# Patient Record
Sex: FEMALE | Race: BLACK OR AFRICAN AMERICAN | Employment: FULL TIME | URBAN - METROPOLITAN AREA
[De-identification: names, ages, dates, MRNs, and addresses within clinical notes are randomized per-mention and may not be internally consistent; named-entity substitution may affect disease eponyms.]

---

## 2018-05-14 ENCOUNTER — APPOINTMENT (OUTPATIENT)
Dept: MRI IMAGING | Age: 58
End: 2018-05-14
Attending: HOSPITALIST
Payer: COMMERCIAL

## 2018-05-14 ENCOUNTER — APPOINTMENT (OUTPATIENT)
Dept: CT IMAGING | Age: 58
End: 2018-05-14
Attending: EMERGENCY MEDICINE
Payer: COMMERCIAL

## 2018-05-14 ENCOUNTER — HOSPITAL ENCOUNTER (OUTPATIENT)
Age: 58
Setting detail: OBSERVATION
Discharge: HOME | End: 2018-05-15
Attending: EMERGENCY MEDICINE | Admitting: HOSPITALIST
Payer: COMMERCIAL

## 2018-05-14 ENCOUNTER — APPOINTMENT (OUTPATIENT)
Dept: CT IMAGING | Age: 58
End: 2018-05-14
Attending: HOSPITALIST
Payer: COMMERCIAL

## 2018-05-14 ENCOUNTER — APPOINTMENT (OUTPATIENT)
Dept: GENERAL RADIOLOGY | Age: 58
End: 2018-05-14
Attending: EMERGENCY MEDICINE
Payer: COMMERCIAL

## 2018-05-14 DIAGNOSIS — R20.0 NUMBNESS AND TINGLING OF LEFT SIDE OF FACE: ICD-10-CM

## 2018-05-14 DIAGNOSIS — R20.2 NUMBNESS AND TINGLING OF LEFT SIDE OF FACE: ICD-10-CM

## 2018-05-14 DIAGNOSIS — G45.9 TRANSIENT CEREBRAL ISCHEMIA, UNSPECIFIED TYPE: Primary | ICD-10-CM

## 2018-05-14 LAB
ALBUMIN SERPL-MCNC: 3.6 G/DL (ref 3.5–5)
ALBUMIN/GLOB SERPL: 0.9 {RATIO} (ref 1.1–2.2)
ALP SERPL-CCNC: 69 U/L (ref 45–117)
ALT SERPL-CCNC: 25 U/L (ref 12–78)
ANION GAP SERPL CALC-SCNC: 8 MMOL/L (ref 5–15)
APTT PPP: 30 SEC (ref 22.1–32)
AST SERPL-CCNC: 17 U/L (ref 15–37)
ATRIAL RATE: 85 BPM
BASOPHILS # BLD: 0 K/UL (ref 0–0.1)
BASOPHILS NFR BLD: 1 % (ref 0–1)
BILIRUB SERPL-MCNC: 0.4 MG/DL (ref 0.2–1)
BUN SERPL-MCNC: 11 MG/DL (ref 6–20)
BUN/CREAT SERPL: 12 (ref 12–20)
CALCIUM SERPL-MCNC: 8.9 MG/DL (ref 8.5–10.1)
CALCULATED P AXIS, ECG09: 27 DEGREES
CALCULATED R AXIS, ECG10: -23 DEGREES
CALCULATED T AXIS, ECG11: 20 DEGREES
CHLORIDE SERPL-SCNC: 107 MMOL/L (ref 97–108)
CO2 SERPL-SCNC: 26 MMOL/L (ref 21–32)
CREAT SERPL-MCNC: 0.9 MG/DL (ref 0.55–1.02)
DIAGNOSIS, 93000: NORMAL
DIFFERENTIAL METHOD BLD: NORMAL
EOSINOPHIL # BLD: 0.2 K/UL (ref 0–0.4)
EOSINOPHIL NFR BLD: 2 % (ref 0–7)
ERYTHROCYTE [DISTWIDTH] IN BLOOD BY AUTOMATED COUNT: 12.9 % (ref 11.5–14.5)
GLOBULIN SER CALC-MCNC: 4.1 G/DL (ref 2–4)
GLUCOSE BLD STRIP.AUTO-MCNC: 202 MG/DL (ref 65–100)
GLUCOSE SERPL-MCNC: 104 MG/DL (ref 65–100)
HCT VFR BLD AUTO: 42.5 % (ref 35–47)
HGB BLD-MCNC: 13.7 G/DL (ref 11.5–16)
IMM GRANULOCYTES # BLD: 0 K/UL (ref 0–0.04)
IMM GRANULOCYTES NFR BLD AUTO: 0 % (ref 0–0.5)
INR BLD: 1.1 (ref 0.9–1.2)
INR PPP: 1 (ref 0.9–1.1)
LYMPHOCYTES # BLD: 2.6 K/UL (ref 0.8–3.5)
LYMPHOCYTES NFR BLD: 30 % (ref 12–49)
MCH RBC QN AUTO: 28.1 PG (ref 26–34)
MCHC RBC AUTO-ENTMCNC: 32.2 G/DL (ref 30–36.5)
MCV RBC AUTO: 87.1 FL (ref 80–99)
MONOCYTES # BLD: 0.5 K/UL (ref 0–1)
MONOCYTES NFR BLD: 5 % (ref 5–13)
NEUTS SEG # BLD: 5.3 K/UL (ref 1.8–8)
NEUTS SEG NFR BLD: 62 % (ref 32–75)
NRBC # BLD: 0 K/UL (ref 0–0.01)
NRBC BLD-RTO: 0 PER 100 WBC
P-R INTERVAL, ECG05: 170 MS
PLATELET # BLD AUTO: 284 K/UL (ref 150–400)
PMV BLD AUTO: 9.1 FL (ref 8.9–12.9)
POTASSIUM SERPL-SCNC: 3.5 MMOL/L (ref 3.5–5.1)
PROT SERPL-MCNC: 7.7 G/DL (ref 6.4–8.2)
PROTHROMBIN TIME: 10.5 SEC (ref 9–11.1)
Q-T INTERVAL, ECG07: 432 MS
QRS DURATION, ECG06: 98 MS
QTC CALCULATION (BEZET), ECG08: 514 MS
RBC # BLD AUTO: 4.88 M/UL (ref 3.8–5.2)
SERVICE CMNT-IMP: ABNORMAL
SODIUM SERPL-SCNC: 141 MMOL/L (ref 136–145)
THERAPEUTIC RANGE,PTTT: NORMAL SECS (ref 58–77)
VENTRICULAR RATE, ECG03: 85 BPM
WBC # BLD AUTO: 8.7 K/UL (ref 3.6–11)

## 2018-05-14 PROCEDURE — 80053 COMPREHEN METABOLIC PANEL: CPT | Performed by: EMERGENCY MEDICINE

## 2018-05-14 PROCEDURE — 70553 MRI BRAIN STEM W/O & W/DYE: CPT

## 2018-05-14 PROCEDURE — 99285 EMERGENCY DEPT VISIT HI MDM: CPT

## 2018-05-14 PROCEDURE — 99218 HC RM OBSERVATION: CPT

## 2018-05-14 PROCEDURE — 74011636320 HC RX REV CODE- 636/320: Performed by: EMERGENCY MEDICINE

## 2018-05-14 PROCEDURE — 71046 X-RAY EXAM CHEST 2 VIEWS: CPT

## 2018-05-14 PROCEDURE — 70496 CT ANGIOGRAPHY HEAD: CPT

## 2018-05-14 PROCEDURE — 74011250636 HC RX REV CODE- 250/636: Performed by: HOSPITALIST

## 2018-05-14 PROCEDURE — 36415 COLL VENOUS BLD VENIPUNCTURE: CPT | Performed by: EMERGENCY MEDICINE

## 2018-05-14 PROCEDURE — 70498 CT ANGIOGRAPHY NECK: CPT

## 2018-05-14 PROCEDURE — 82962 GLUCOSE BLOOD TEST: CPT

## 2018-05-14 PROCEDURE — 70450 CT HEAD/BRAIN W/O DYE: CPT

## 2018-05-14 PROCEDURE — 93005 ELECTROCARDIOGRAM TRACING: CPT

## 2018-05-14 PROCEDURE — 85730 THROMBOPLASTIN TIME PARTIAL: CPT | Performed by: EMERGENCY MEDICINE

## 2018-05-14 PROCEDURE — 85610 PROTHROMBIN TIME: CPT | Performed by: EMERGENCY MEDICINE

## 2018-05-14 PROCEDURE — 74011000258 HC RX REV CODE- 258: Performed by: EMERGENCY MEDICINE

## 2018-05-14 PROCEDURE — 85025 COMPLETE CBC W/AUTO DIFF WBC: CPT | Performed by: EMERGENCY MEDICINE

## 2018-05-14 PROCEDURE — 74011636637 HC RX REV CODE- 636/637: Performed by: HOSPITALIST

## 2018-05-14 PROCEDURE — 85610 PROTHROMBIN TIME: CPT

## 2018-05-14 PROCEDURE — A9575 INJ GADOTERATE MEGLUMI 0.1ML: HCPCS | Performed by: HOSPITALIST

## 2018-05-14 RX ORDER — ACETAMINOPHEN 650 MG/1
650 SUPPOSITORY RECTAL
Status: DISCONTINUED | OUTPATIENT
Start: 2018-05-14 | End: 2018-05-15 | Stop reason: HOSPADM

## 2018-05-14 RX ORDER — SODIUM CHLORIDE 0.9 % (FLUSH) 0.9 %
5-10 SYRINGE (ML) INJECTION AS NEEDED
Status: DISCONTINUED | OUTPATIENT
Start: 2018-05-14 | End: 2018-05-15 | Stop reason: HOSPADM

## 2018-05-14 RX ORDER — GUAIFENESIN 100 MG/5ML
81 LIQUID (ML) ORAL DAILY
Status: DISCONTINUED | OUTPATIENT
Start: 2018-05-15 | End: 2018-05-15 | Stop reason: HOSPADM

## 2018-05-14 RX ORDER — MAGNESIUM SULFATE 100 %
4 CRYSTALS MISCELLANEOUS AS NEEDED
Status: DISCONTINUED | OUTPATIENT
Start: 2018-05-14 | End: 2018-05-15 | Stop reason: HOSPADM

## 2018-05-14 RX ORDER — ENALAPRIL MALEATE 5 MG/1
5 TABLET ORAL DAILY
Status: DISCONTINUED | OUTPATIENT
Start: 2018-05-15 | End: 2018-05-15 | Stop reason: HOSPADM

## 2018-05-14 RX ORDER — INSULIN LISPRO 100 [IU]/ML
INJECTION, SOLUTION INTRAVENOUS; SUBCUTANEOUS
Status: DISCONTINUED | OUTPATIENT
Start: 2018-05-14 | End: 2018-05-15 | Stop reason: HOSPADM

## 2018-05-14 RX ORDER — LABETALOL HYDROCHLORIDE 5 MG/ML
5 INJECTION, SOLUTION INTRAVENOUS
Status: DISCONTINUED | OUTPATIENT
Start: 2018-05-14 | End: 2018-05-15 | Stop reason: HOSPADM

## 2018-05-14 RX ORDER — DEXTROSE 50 % IN WATER (D50W) INTRAVENOUS SYRINGE
12.5-25 AS NEEDED
Status: DISCONTINUED | OUTPATIENT
Start: 2018-05-14 | End: 2018-05-15 | Stop reason: HOSPADM

## 2018-05-14 RX ORDER — SODIUM CHLORIDE 0.9 % (FLUSH) 0.9 %
10 SYRINGE (ML) INJECTION
Status: COMPLETED | OUTPATIENT
Start: 2018-05-14 | End: 2018-05-14

## 2018-05-14 RX ORDER — SODIUM CHLORIDE 9 MG/ML
25 INJECTION, SOLUTION INTRAVENOUS CONTINUOUS
Status: DISPENSED | OUTPATIENT
Start: 2018-05-14 | End: 2018-05-15

## 2018-05-14 RX ORDER — ACETAMINOPHEN 325 MG/1
650 TABLET ORAL
Status: DISCONTINUED | OUTPATIENT
Start: 2018-05-14 | End: 2018-05-15 | Stop reason: HOSPADM

## 2018-05-14 RX ORDER — SODIUM CHLORIDE 0.9 % (FLUSH) 0.9 %
5-10 SYRINGE (ML) INJECTION EVERY 8 HOURS
Status: DISCONTINUED | OUTPATIENT
Start: 2018-05-14 | End: 2018-05-15 | Stop reason: HOSPADM

## 2018-05-14 RX ORDER — ERGOCALCIFEROL 1.25 MG/1
50000 CAPSULE ORAL
COMMUNITY

## 2018-05-14 RX ORDER — DOCUSATE SODIUM 100 MG/1
100 CAPSULE, LIQUID FILLED ORAL
Status: DISCONTINUED | OUTPATIENT
Start: 2018-05-14 | End: 2018-05-15 | Stop reason: HOSPADM

## 2018-05-14 RX ORDER — GADOTERATE MEGLUMINE 376.9 MG/ML
16 INJECTION INTRAVENOUS
Status: COMPLETED | OUTPATIENT
Start: 2018-05-14 | End: 2018-05-14

## 2018-05-14 RX ORDER — ENOXAPARIN SODIUM 100 MG/ML
40 INJECTION SUBCUTANEOUS EVERY 24 HOURS
Status: DISCONTINUED | OUTPATIENT
Start: 2018-05-14 | End: 2018-05-15 | Stop reason: HOSPADM

## 2018-05-14 RX ADMIN — SODIUM CHLORIDE 25 ML/HR: 900 INJECTION, SOLUTION INTRAVENOUS at 22:58

## 2018-05-14 RX ADMIN — Medication 10 ML: at 18:51

## 2018-05-14 RX ADMIN — Medication 10 ML: at 20:55

## 2018-05-14 RX ADMIN — ENOXAPARIN SODIUM 40 MG: 40 INJECTION SUBCUTANEOUS at 23:02

## 2018-05-14 RX ADMIN — IOPAMIDOL 100 ML: 755 INJECTION, SOLUTION INTRAVENOUS at 20:54

## 2018-05-14 RX ADMIN — GADOTERATE MEGLUMINE 16 ML: 376.9 INJECTION INTRAVENOUS at 18:51

## 2018-05-14 RX ADMIN — SODIUM CHLORIDE 100 ML: 900 INJECTION, SOLUTION INTRAVENOUS at 20:55

## 2018-05-14 RX ADMIN — Medication 10 ML: at 23:02

## 2018-05-14 RX ADMIN — Medication 10 ML: at 17:12

## 2018-05-14 RX ADMIN — INSULIN LISPRO 2 UNITS: 100 INJECTION, SOLUTION INTRAVENOUS; SUBCUTANEOUS at 22:57

## 2018-05-14 NOTE — PROGRESS NOTES
Admission Medication Reconciliation:    Information obtained from: Medication list from [facility]    Significant PMH/Disease States:   Past Medical History:   Diagnosis Date    Diabetes [facility])        Chief Complaint for this Admission:  Numbness    Allergies:  Latex; Ciprofloxacin; Codeine; Keflex [cephalexin]; and Pcn [penicillins]    Prior to Admission Medications:   Prior to Admission Medications   Prescriptions Last Dose Informant Patient Reported? Taking?   albiglutide (TANZEUM) 50 mg/0.5 mL injector pen 2018  Yes Yes   Sig: by SubCUTAneous route every Tuesday. enalapril (VASOTEC) 5 mg tablet 2018 at Unknown time  Yes Yes   Sig: Take  by mouth daily. ergocalciferol (VITAMIN D2) 50,000 unit capsule 2018 at Unknown time  Yes Yes   Sig: Take 50,000 Units by mouth every . insulin detemir (LEVEMIR) 100 unit/mL injection   Yes No   Si Units by SubCUTAneous route nightly. insulin regular human (AFREZZA) 4 unit CtDv 2018 at Unknown time  Yes Yes   Sig: Take 16 Units by inhalation three (3) times daily (with meals). metFORMIN (GLUCOPHAGE) 500 mg tablet 2018 at Unknown time  Yes Yes   Sig: Take 500 mg by mouth nightly. Facility-Administered Medications: None         Comments/Recommendations: Patient is a reliable historian, allergies were reviewed. Patient will provide non-formulary drugs if ordered. Added:  1. Vitamin D  2. Tanzeum  3. Afrezza    Revised:  1. Lantus  2. Metformin    Deleted:  1. Antibiotics (doxycycline, gentamycin cream, Bactrim DS)  2. Glimepiride  3. Novolog insulin    Thank you for allowing me to participate in the care of your patient.     Jonathan RubioD, RN #[id number]

## 2018-05-14 NOTE — ED TRIAGE NOTES
Pt arrives [facility] for left sided facial, arm and leg numbness and tingling. LTKW 1030. Took ASA, states symptoms are slightly resolving since 1130. Pt to CT upon arrival. ER MD in CT for eval.   BG 93.

## 2018-05-14 NOTE — PROGRESS NOTES
TRANSFER - IN REPORT:    Verbal report received from angela(name) on Itz Mandujano  being received from ed(unit) for routine progression of care      Report consisted of patients Situation, Background, Assessment and   Recommendations(SBAR). Information from the following report(s) Kardex, ED Summary, Intake/Output, MAR and Recent Results was reviewed with the receiving nurse. Opportunity for questions and clarification was provided. Assessment completed upon patients arrival to unit and care assumed.

## 2018-05-14 NOTE — ED NOTES
TRANSFER - OUT REPORT:    Verbal report given to Miriam Jimenez (name) on Mica Lines  being transferred to 5 Obs(unit) for routine progression of care       Report consisted of patients Situation, Background, Assessment and   Recommendations(SBAR). Information from the following report(s) SBAR, Kardex, ED Summary and Recent Results was reviewed with the receiving nurse. Lines:   Peripheral IV 05/14/18 Right Antecubital (Active)   Site Assessment Clean, dry, & intact 5/14/2018  4:00 PM   Phlebitis Assessment 0 5/14/2018  4:00 PM   Infiltration Assessment 0 5/14/2018  4:00 PM   Dressing Status Clean, dry, & intact 5/14/2018  4:00 PM   Dressing Type Transparent 5/14/2018  4:00 PM   Hub Color/Line Status Pink;Flushed;Patent 5/14/2018  3:25 PM   Action Taken Blood drawn 5/14/2018  3:25 PM   Alcohol Cap Used Yes 5/14/2018  4:00 PM        Opportunity for questions and clarification was provided.       Patient transported with:   Monitor

## 2018-05-14 NOTE — H&P
[address] [facility]  HISTORY AND PHYSICAL      Alvino Romero.  MR#: [id number]  : 1960  ACCOUNT #: [de-identified]   ADMIT DATE: 2018    PRIMARY CARE PHYSICIAN:  Unremarkable. SOURCE OF INFORMATION:  The patient. CHIEF COMPLAINT:  Left lower extremity numbness since 8:30 a.m. this morning. HISTORY OF PRESENT ILLNESS:  This is a 65-year-old -American woman with past medical history significant for type 2 diabetes who referred by [facility] after she presented with left side of face numbness, tingling sensation involving the left face, left ear, left shoulder and left lower extremity. She had associated sweating and her coworker gave her aspirin 2 tablets initially and she got herself together and drove to [facility] and then she referred [facility] ED. Her tingling and numbness sensation of her left side of the face, shoulder, arm is resolved after 3 hours She still has left lower extremity numbness sensation. No left-sided chest pain, palpitation, shortness of breath, fever, cough, nausea, vomiting, abdominal pain, urinary complaint or abnormal bowel movement. No history of hypertension, arrhythmia, heart disease or chronic kidney disease. She does not smoke cigarettes. On arrival to ER her blood pressure was 149/81, pulse 81, temperature 98.1, saturation of oxygen 99% on room air. CT scan of the head without contrast was done and study:  No acute intracranial hemorrhage, mass or infarct. Chest x-ray ordered and the patient referred to hospitalist service for further evaluation and admission. REVIEW OF SYSTEMS:  Pertinent positive findings mentioned in HPI. All other systems reviewed. Not any other positive finding. PAST MEDICAL HISTORY:  Diabetes mellitus type 2. PRIOR TO ADMISSION MEDICATIONS INCLUDE:  1. Metformin 500 mg p.o. b.i.d.  2.   Enalapril 5 mg p.o. daily. 3.  Glimepiride 4 mg p.o. in the morning.   4.  Insulin detemir 100 units at bedtime. ALLERGIES:  1. LATEX. 2.  CIPROFLOXACIN. 3.  CODEINE. [address]. 5.  PENICILLIN. SOCIAL HISTORY:  Lives with her . No tobacco or alcohol abuse. Drinks occasionally. Ambulates independently. CODE STATUS:  FULL CODE. FAMILY HISTORY:  Father had history of diabetes mellitus and prostate cancer. Mother, history of hypertension. PHYSICAL EXAMINATION:  VITAL SIGNS:  Blood pressure 128/78, pulse 84, temperature 98.1, respiratory rate 11, saturation of oxygen 98% on room air. BMI 31.22 kg per square meter. GENERAL:  The patient is alert, cooperative, not in distress. She appears her stated age. HEENT:  Pink conjunctivae, anicteric sclerae. Moist tongue and buccal mucosa. LUNGS:  Clear to auscultation bilaterally. CHEST WALL:  No tenderness or deformity. HEART:  Regular rate and rhythm, S1 and S2 normal.  No murmur or gallop. ABDOMEN:  Soft, nontender. Bowel sounds normal.  No mass or organomegaly. SKIN:  No rash or lesion. CENTRAL NERVOUS SYSTEM:  Conscious; well oriented to time, place and person. Motor 5/5. Sensation intact. Cranial nerves II-XII grossly intact. DIAGNOSTIC DATA:  EKG normal sinus rhythm, ventricular rate 85 beats per minute, nonspecific ST-T wave. White blood cell count 8.7, hemoglobin 13.7, hematocrit 42.5, platelet count 585. INR 1.0. Chemistry:  Sodium 141, potassium 3.5, chloride 107, CO2 is 26, anion gap 8, glucose 104, BUN 11, creatinine 0.90. BUN/creatinine ratio 12, calcium 8.9, total protein 7.7, albumin 3.6, globulin 4.1, ALT 25, AST 17, alkaline phosphatase 69. CT of the head without contrast, no acute intracranial process. EKG:  Normal sinus rhythm, no evidence of ischemia. ASSESSMENT:  1.  Left-sided numbness, rule out acute stroke. 2.  Diabetes mellitus type 2. PLAN:  1. Left-sided numbness, rule out acute stroke. Admit the patient under observation.   will do CTA of the head and neck, echocardiography, lipid panel, hemoglobin A1c.  Keep patient n.p.o., swallowing evaluation, MRI in the a.m. and TSH. We will consult neurologist for further evaluation and admission. 2.  Diabetes mellitus type 2. Hold metformin and glimepiride. will check hemoglobin A1c, swallowing evaluation. will monitor fingerstick glucose on sliding scale. DVT prophylaxis. Lovenox.       MD ERICK Quezada/MN  D: 05/14/2018 16:49     T: 05/14/2018 17:37  JOB #: [id number]

## 2018-05-14 NOTE — IP AVS SNAPSHOT
2700 [facility] [address] [place] [address] 
[phone] Patient: Valentín Jolly MRN: [id number] HTN:8/80/0999 A check jarod indicates which time of day the medication should be taken. My Medications START taking these medications Instructions Each Dose to Equal  
 Morning Noon Evening Bedtime  
 aspirin 81 mg chewable tablet Start taking on:  5/16/2018 Your last dose was: Your next dose is: Take 1 Tab by mouth daily. 81 mg  
    
   
   
   
  
 atorvastatin 10 mg tablet Commonly known as:  LIPITOR Start taking on:  5/16/2018 Your last dose was: Your next dose is: Take 1 Tab by mouth daily. 10 mg CONTINUE taking these medications Instructions Each Dose to Equal  
 Morning Noon Evening Bedtime AFREZZA 4 unit Ctdv Generic drug:  insulin regular human Your last dose was: Your next dose is: Take 16 Units by inhalation three (3) times daily (with meals). 16 Units  
    
   
   
   
  
 enalapril 5 mg tablet Commonly known as:  Larina Dense Your last dose was: Your next dose is: Take  by mouth daily. LEVEMIR U-100 INSULIN 100 unit/mL injection Generic drug:  insulin detemir U-100 Your last dose was: Your next dose is:    
   
   
 30 Units by SubCUTAneous route nightly. 30 Units  
    
   
   
   
  
 metFORMIN 500 mg tablet Commonly known as:  GLUCOPHAGE Your last dose was: Your next dose is: Take 500 mg by mouth nightly. 500 mg  
    
   
   
   
  
 TANZEUM 50 mg/0.5 mL injector pen Generic drug:  albiglutide Your last dose was: Your next dose is:    
   
   
 by SubCUTAneous route every Tuesday. VITAMIN D2 50,000 unit capsule Generic drug:  ergocalciferol Your last dose was: Your next dose is: Take 50,000 Units by mouth every Sunday. [address]37 Units Where to Get Your Medications Information on where to get these meds will be given to you by the nurse or doctor. ! Ask your nurse or doctor about these medications  
  aspirin 81 mg chewable tablet  
 atorvastatin 10 mg tablet

## 2018-05-14 NOTE — ED NOTES
Pt placed on monitor x 3, side rails up x 2, call light placed within reach, bed in lowest and locked position, pt changed into gown.

## 2018-05-14 NOTE — IP AVS SNAPSHOT
[facility] [address] 
[phone] Patient: Josias Mueller MRN: [id number] FBL:4/60/5839 About your hospitalization You were admitted on:  May 14, 2018 You last received care in the:  [facility] 5 OBSERVATION You were discharged on:  May 15, 2018 Why you were hospitalized Your primary diagnosis was:  Tia (Transient Ischemic Attack) Your diagnoses also included:  Numbness And Tingling Of Left Side Of Face Follow-up Information Follow up With Details Comments Contact Info [facility] In 1 week hospital discharge follow up. [address] [address] Inocencio Bravo MD In 2 weeks left knee pain [address] [address] [address] [place] [address] 
[phone] [facility] at [facility] Schedule an appointment as soon as possible for a visit in 4 weeks TIA follow up. [address] [address] 
[phone] Discharge Orders None A check jarod indicates which time of day the medication should be taken. My Medications START taking these medications Instructions Each Dose to Equal  
 Morning Noon Evening Bedtime  
 aspirin 81 mg chewable tablet Start taking on:  5/16/2018 Your last dose was: Your next dose is: Take 1 Tab by mouth daily. 81 mg  
    
   
   
   
  
 atorvastatin 10 mg tablet Commonly known as:  LIPITOR Start taking on:  5/16/2018 Your last dose was: Your next dose is: Take 1 Tab by mouth daily. 10 mg CONTINUE taking these medications Instructions Each Dose to Equal  
 Morning Noon Evening Bedtime AFREZZA 4 unit Ctdv Generic drug:  insulin regular human Your last dose was: Your next dose is: Take 16 Units by inhalation three (3) times daily (with meals). 16 Units enalapril 5 mg tablet Commonly known as:  Jeanine Baltimore Your last dose was: Your next dose is: Take  by mouth daily. LEVEMIR U-100 INSULIN 100 unit/mL injection Generic drug:  insulin detemir U-100 Your last dose was: Your next dose is:    
   
   
 30 Units by SubCUTAneous route nightly. 30 Units  
    
   
   
   
  
 metFORMIN 500 mg tablet Commonly known as:  GLUCOPHAGE Your last dose was: Your next dose is: Take 500 mg by mouth nightly. 500 mg  
    
   
   
   
  
 TANZEUM 50 mg/0.5 mL injector pen Generic drug:  albiglutide Your last dose was: Your next dose is:    
   
   
 by SubCUTAneous route every Tuesday. VITAMIN D2 50,000 unit capsule Generic drug:  ergocalciferol Your last dose was: Your next dose is: Take 50,000 Units by mouth every Sunday. [address] Units Where to Get Your Medications Information on where to get these meds will be given to you by the nurse or doctor. ! Ask your nurse or doctor about these medications  
  aspirin 81 mg chewable tablet  
 atorvastatin 10 mg tablet Discharge Instructions Discharge Instructions PATIENT ID: Kevin Hinton MRN: [id number] YOB: 1960 DATE OF ADMISSION: 5/14/2018  3:02 PM   
DATE OF DISCHARGE: 5/15/2018 PRIMARY CARE PROVIDER: [facility] ATTENDING PHYSICIAN: Erlinda Mccormick MD 
DISCHARGING PROVIDER: Erlinda Mccorimck MD   
To contact this individual call [phone] and ask the  to page. If unavailable ask to be transferred the Adult Hospitalist Department. DISCHARGE DIAGNOSES  
TIA 
 
CONSULTATIONS: IP CONSULT TO NEUROLOGY PROCEDURES/SURGERIES: * No surgery found * PENDING TEST RESULTS:  
At the time of discharge the following test results are still pending: FOLLOW UP APPOINTMENTS:  
Follow-up Information Follow up With Details Comments Contact Info [facility]cp [facility] In 1 week hospital discharge follow up. [address] [address] Asia Vasquez MD In 2 weeks left knee pain [address] [address] Stuart Garcia 13 
[phone] [facility] at [address] Schedule an appointment as soon as possible for a visit in 4 weeks TIA follow up. 217 [facility] [address] [facility] [address] 
[phone] ADDITIONAL CARE RECOMMENDATIONS:  
1. Schedule follow up appointmnets 2. Follow up with ortho for knee pain. DIET: Diabetic Diet Oral Nutritional Supplements:    
 
ACTIVITY: Activity as tolerated DISCHARGE MEDICATIONS: 
 See Medication Reconciliation Form · It is important that you take the medication exactly as they are prescribed. · Keep your medication in the bottles provided by the pharmacist and keep a list of the medication names, dosages, and times to be taken in your wallet. · Do not take other medications without consulting your doctor. NOTIFY YOUR PHYSICIAN FOR ANY OF THE FOLLOWING:  
Fever over 101 degrees for 24 hours. Chest pain, shortness of breath, fever, chills, nausea, vomiting, diarrhea, change in mentation, falling, weakness, bleeding. Severe pain or pain not relieved by medications. Or, any other signs or symptoms that you may have questions about. DISPOSITION: 
 x Home With: 
 OT  PT  [facility]  RN  
  
 SNF/Inpatient Rehab/LTAC Independent/assisted living Hospice Other: CDMP Checked:  
Yes x PROBLEM LIST Updated: 
Yes x Signed:  
Carlene Gosselin, MD 
5/15/2018 4:15 PM 
 
  
  
  
Introducing [facility]! [facility] introduces [employer] patient portal. Now you can access parts of your medical record, email your doctor's office, and request medication refills online. 1. In your internet browser, go to [email or web address] 2. Click on the First Time User? Click Here link in the Sign In box. You will see the New Member Sign Up page. 3. Enter your [employer] Access Code exactly as it appears below. You will not need to use this code after youve completed the sign-up process. If you do not sign up before the expiration date, you must request a new code. · [employer] Access Code: [email or web address] Expires: 8/12/2018  3:22 PM 
 
4. Enter the last four digits of your Social Security Number (xxxx) and Date of Birth (mm/dd/yyyy) as indicated and click Submit. You will be taken to the next sign-up page. 5. Create a [employer] ID. This will be your [employer] login ID and cannot be changed, so think of one that is secure and easy to remember. 6. Create a [employer] password. You can change your password at any time. 7. Enter your Password Reset Question and Answer. This can be used at a later time if you forget your password. 8. Enter your e-mail address. You will receive e-mail notification when new information is available in [address] 9. Click Sign Up. You can now view and download portions of your medical record. 10. Click the Download Summary menu link to download a portable copy of your medical information. If you have questions, please visit the Frequently Asked Questions section of the [employer] website. Remember, [employer] is NOT to be used for urgent needs. For medical emergencies, dial 911. Now available from your iPhone and Android! Introducing [employer]itia As a [facility] patient, I wanted to make you aware of our electronic visit tool called [employer]it[employer]. [facility] 24/7 allows you to connect within minutes with a medical provider 24 hours a day, seven days a week via a mobile device or tablet or logging into a secure website from your computer. You can access Juan Carlos Avitia from anywhere in the [place].  
 
A virtual visit might be right for you when you have a simple condition and feel like you just dont want to get out of bed, or cant get away from work for an appointment, when your regular [facility] provider is not available (evenings, weekends or holidays), or when youre out of town and need minor care. Electronic visits cost only $49 and if the [employer] 24/7 provider determines a prescription is needed to treat your condition, one can be electronically transmitted to a nearby pharmacy*. Please take a moment to enroll today if you have not already done so. The enrollment process is free and takes just a few minutes. To enroll, please download the [employer] 24/7 kayla to your tablet or phone, or visit [email or web address] to enroll on your computer. And, as an [address] [place] [address] patient with a [employer] account, the results of your visits will be scanned into your electronic medical record and your primary care provider will be able to view the scanned results. We urge you to continue to see your regular [facility] provider for your ongoing medical care. And while your primary care provider may not be the one available when you seek a [employer] virtual visit, the peace of mind you get from getting a real diagnosis real time can be priceless. For more information on [employer], view our Frequently Asked Questions (FAQs) at [email or web address]. Sincerely, 
 
Sigrid Macias MD 
Chief Medical Officer Melvi Kayli Vimal *:  certain medications cannot be prescribed via [employer] Unresulted Labs-Please follow up with your PCP about these lab tests Order Current Status DUPLEX LOWER EXT VENOUS LEFT Preliminary result Providers Seen During Your Hospitalization Provider Specialty Primary office phone Bita Ellis MD Emergency Medicine [phone] Jaja Hagan MD Internal Medicine [phone] Won Bautista MD Hospitalist [phone] Your Primary Care Physician (PCP) Primary Care Physician Office Phone Office Fax [facility], PCP ** None ** [phone] You are allergic to the following Allergen Reactions Latex Rash Ciprofloxacin Rash Codeine Rash Keflex (Cephalexin) Rash Pcn (Penicillins) Rash All cillins/keflex Recent Documentation Height Weight BMI OB Status Smoking Status 1.651 m 85.1 kg 31.22 kg/m2 Menopause Never Smoker Emergency Contacts Name Discharge Info Relation Home Work Mobile Jewel Prater DISCHARGE CAREGIVER [3] Spouse [3] [phone] Patient Belongings The following personal items are in your possession at time of discharge: 
     Visual Aid: At bedside, Glasses, With patient Please provide this summary of care documentation to your next provider. Signatures-by signing, you are acknowledging that this After Visit Summary has been reviewed with you and you have received a copy. Patient Signature:  ____________________________________________________________ Date:  ____________________________________________________________  
  
Claudene Born Provider Signature:  ____________________________________________________________ Date:  ____________________________________________________________

## 2018-05-14 NOTE — ED NOTES
Pt's  at bedside. Bedside and Verbal shift change report given to ALE Aldridge (oncoming nurse) by Jorgito Leblanc RN (offgoing nurse). Report included the following information SBAR, Kardex, ED Summary, Procedure Summary, Accordion and Recent Results.

## 2018-05-14 NOTE — ED PROVIDER NOTES
HPI Comments: 60 y.o. female with past medical history significant for DM who presents via EMS from [facility] to the ED with cc of numbness. Pt states she woke feeling normal this morning but reports onset of posterior left knee pain at 8:30AM that caused her to ambulate with a limp. At 10:30 AM, she reportedly felt diffuse left-sided numbness that she describes as a \"sharp needle\" sensation from her face down through her LUE and LLE. She reports associated tinnitus and BL hand tingling at the time. Per pt, she took [place] couple\" ASA and drove herself to [facility], where she was referred to the ED. She states her symptoms began to improve at 11:00AM. At present, she reports persistent posterior left knee pain and persistent tingling to her lower left face, including her left cheek, jaw, and ear. Pt denies hx of the same. She specifically denies any headache. There are no other acute medical concerns at this time. Social Hx: denies Tobacco use, rare EtOH use  PCP: Luly Friedman MD    Note written by Elías Garcia, as dictated by Alison Chowdhury MD 3:07 PM      The history is provided by the patient. No  was used. Past Medical History:   Diagnosis Date    Diabetes [facility])        Past Surgical History:   Procedure Laterality Date    HX GYN  2007    hysterectomy    HX ORTHOPAEDIC  2011    double wrist    [place] [employer]    removal of left glands armpit          No family history on file. Social History     Social History    Marital status:      Spouse name: N/A    Number of children: N/A    Years of education: N/A     Occupational History    Not on file.      Social History Main Topics    Smoking status: Never Smoker    Smokeless tobacco: Not on file    Alcohol use Not on file      Comment: rarely    Drug use: Not on file    Sexual activity: Not on file     Other Topics Concern    Not on file     Social History Narrative    No narrative on file         ALLERGIES: Keflex [cephalexin] and Pcn [penicillins]    Review of Systems   Constitutional: Negative for activity change, appetite change and fatigue. HENT: Positive for tinnitus. Negative for ear pain, facial swelling, sore throat and trouble swallowing. Eyes: Negative for pain, discharge and visual disturbance. Respiratory: Negative for chest tightness, shortness of breath and wheezing. Cardiovascular: Negative for chest pain and palpitations. Gastrointestinal: Negative for abdominal pain, blood in stool, nausea and vomiting. Genitourinary: Negative for difficulty urinating, flank pain and hematuria. Musculoskeletal: Positive for arthralgias. Negative for joint swelling and neck pain. Skin: Negative for color change and rash. Neurological: Positive for numbness. Negative for dizziness, weakness and headaches. Hematological: Negative for adenopathy. Does not bruise/bleed easily. Psychiatric/Behavioral: Negative for behavioral problems, confusion and sleep disturbance. All other systems reviewed and are negative. Vitals:    05/14/18 1521   BP: 149/82   Pulse: 81   Resp: 17   Temp: 98.1 °F (36.7 °C)   SpO2: 99%   Weight: 85.1 kg (187 lb 9.8 oz)   Height: 5' 5\" (1.651 m)            Physical Exam   Constitutional: She is oriented to person, place, and time. She appears well-developed and well-nourished. No distress. HENT:   Head: Normocephalic and atraumatic. Nose: Nose normal.   Mouth/Throat: Oropharynx is clear and moist.   Eyes: Conjunctivae and EOM are normal. Pupils are equal, round, and reactive to light. No scleral icterus. Neck: Normal range of motion. Neck supple. No JVD present. No tracheal deviation present. No thyromegaly present. No carotid bruits noted. Cardiovascular: Normal rate, regular rhythm, normal heart sounds and intact distal pulses. Exam reveals no gallop and no friction rub. No murmur heard.   Pulmonary/Chest: Effort normal and breath sounds normal. No respiratory distress. She has no wheezes. She has no rales. She exhibits no tenderness. Abdominal: Soft. Bowel sounds are normal. She exhibits no distension and no mass. There is no tenderness. There is no rebound and no guarding. Musculoskeletal: Normal range of motion. She exhibits no edema or tenderness. LLE not TTP   Lymphadenopathy:     She has no cervical adenopathy. Neurological: She is alert and oriented to person, place, and time. She has normal reflexes. No cranial nerve deficit. Coordination normal.   No focal findings, LLE weakness with hip flexion   Skin: Skin is warm and dry. No rash noted. No erythema. Psychiatric: She has a normal mood and affect. Her behavior is normal. Judgment and thought content normal.   Nursing note and vitals reviewed. Note written by Elías Vines, as dictated by Shawn Mesa MD 3:07 PM      MDM  Number of Diagnoses or Management Options  Transient cerebral ischemia, unspecified type: new and requires workup     Amount and/or Complexity of Data Reviewed  Clinical lab tests: ordered and reviewed  Tests in the radiology section of CPT®: ordered and reviewed  Decide to obtain previous medical records or to obtain history from someone other than the patient: yes  Review and summarize past medical records: yes  Discuss the patient with other providers: yes  Independent visualization of images, tracings, or specimens: yes    Risk of Complications, Morbidity, and/or Mortality  Presenting problems: high  Diagnostic procedures: high  Management options: high    Patient Progress  Patient progress: stable        ED Course       Procedures    CONSULT NOTE:  3:22 PM Shawn Mesa MD spoke with Dr. Mariela Brower, Consult for Tele-Neurology. Discussed available diagnostic tests and clinical findings. Dr. Eli Echeverria recommends admission to hospitalist for workup of TIA.     ED EKG interpretation:  Sinus rhythm, rate 85, with left axis shift, normal intervals, no ectopy, poor R wave progression, no acute ischemic change. Note written by Elías Freeman, as dictated by Suzy Salcido MD 3:28 PM    Labs normal. CT unremarkable. Will consult hospitalist for admission. CONSULT NOTE:  4:13 PM Suzy Salcido MD communicated with Dr. Misti Mccallum, Consult for Hospitalist via [facility]. Discussed available diagnostic tests and clinical findings. Dr. Misti Mccallum accepts pt for admission.

## 2018-05-15 ENCOUNTER — APPOINTMENT (OUTPATIENT)
Dept: MRI IMAGING | Age: 58
End: 2018-05-15
Attending: HOSPITALIST
Payer: COMMERCIAL

## 2018-05-15 ENCOUNTER — APPOINTMENT (OUTPATIENT)
Dept: NUCLEAR MEDICINE | Age: 58
End: 2018-05-15
Attending: HOSPITALIST
Payer: COMMERCIAL

## 2018-05-15 VITALS
BODY MASS INDEX: 31.26 KG/M2 | TEMPERATURE: 98.1 F | OXYGEN SATURATION: 91 % | RESPIRATION RATE: 16 BRPM | SYSTOLIC BLOOD PRESSURE: 184 MMHG | DIASTOLIC BLOOD PRESSURE: 53 MMHG | HEIGHT: 65 IN | WEIGHT: 187.61 LBS | HEART RATE: 93 BPM

## 2018-05-15 PROBLEM — G45.9 TIA (TRANSIENT ISCHEMIC ATTACK): Status: ACTIVE | Noted: 2018-05-15

## 2018-05-15 LAB
ALBUMIN SERPL-MCNC: 3.1 G/DL (ref 3.5–5)
ALBUMIN/GLOB SERPL: 0.8 {RATIO} (ref 1.1–2.2)
ALP SERPL-CCNC: 64 U/L (ref 45–117)
ALT SERPL-CCNC: 23 U/L (ref 12–78)
ANION GAP SERPL CALC-SCNC: 7 MMOL/L (ref 5–15)
AST SERPL-CCNC: 20 U/L (ref 15–37)
BILIRUB SERPL-MCNC: 0.3 MG/DL (ref 0.2–1)
BUN SERPL-MCNC: 12 MG/DL (ref 6–20)
BUN/CREAT SERPL: 15 (ref 12–20)
CALCIUM SERPL-MCNC: 8.7 MG/DL (ref 8.5–10.1)
CHLORIDE SERPL-SCNC: 108 MMOL/L (ref 97–108)
CHOLEST SERPL-MCNC: 140 MG/DL
CO2 SERPL-SCNC: 26 MMOL/L (ref 21–32)
CREAT SERPL-MCNC: 0.79 MG/DL (ref 0.55–1.02)
ERYTHROCYTE [DISTWIDTH] IN BLOOD BY AUTOMATED COUNT: 12.7 % (ref 11.5–14.5)
EST. AVERAGE GLUCOSE BLD GHB EST-MCNC: 212 MG/DL
GLOBULIN SER CALC-MCNC: 3.7 G/DL (ref 2–4)
GLUCOSE BLD STRIP.AUTO-MCNC: 123 MG/DL (ref 65–100)
GLUCOSE BLD STRIP.AUTO-MCNC: 162 MG/DL (ref 65–100)
GLUCOSE SERPL-MCNC: 139 MG/DL (ref 65–100)
HBA1C MFR BLD: 9 % (ref 4.2–6.3)
HCT VFR BLD AUTO: 40.2 % (ref 35–47)
HDLC SERPL-MCNC: 52 MG/DL
HDLC SERPL: 2.7 {RATIO} (ref 0–5)
HGB BLD-MCNC: 13.3 G/DL (ref 11.5–16)
LDLC SERPL CALC-MCNC: 72.4 MG/DL (ref 0–100)
LIPID PROFILE,FLP: NORMAL
MCH RBC QN AUTO: 28 PG (ref 26–34)
MCHC RBC AUTO-ENTMCNC: 33.1 G/DL (ref 30–36.5)
MCV RBC AUTO: 84.6 FL (ref 80–99)
NRBC # BLD: 0 K/UL (ref 0–0.01)
NRBC BLD-RTO: 0 PER 100 WBC
PLATELET # BLD AUTO: 288 K/UL (ref 150–400)
PMV BLD AUTO: 9.4 FL (ref 8.9–12.9)
POTASSIUM SERPL-SCNC: 3.8 MMOL/L (ref 3.5–5.1)
PROT SERPL-MCNC: 6.8 G/DL (ref 6.4–8.2)
RBC # BLD AUTO: 4.75 M/UL (ref 3.8–5.2)
SERVICE CMNT-IMP: ABNORMAL
SERVICE CMNT-IMP: ABNORMAL
SODIUM SERPL-SCNC: 141 MMOL/L (ref 136–145)
TRIGL SERPL-MCNC: 78 MG/DL (ref ?–150)
VLDLC SERPL CALC-MCNC: 15.6 MG/DL
WBC # BLD AUTO: 8 K/UL (ref 3.6–11)

## 2018-05-15 PROCEDURE — 80061 LIPID PANEL: CPT | Performed by: HOSPITALIST

## 2018-05-15 PROCEDURE — 93971 EXTREMITY STUDY: CPT

## 2018-05-15 PROCEDURE — 97165 OT EVAL LOW COMPLEX 30 MIN: CPT

## 2018-05-15 PROCEDURE — 82962 GLUCOSE BLOOD TEST: CPT

## 2018-05-15 PROCEDURE — G8980 MOBILITY D/C STATUS: HCPCS

## 2018-05-15 PROCEDURE — 74011250637 HC RX REV CODE- 250/637: Performed by: HOSPITALIST

## 2018-05-15 PROCEDURE — 80053 COMPREHEN METABOLIC PANEL: CPT | Performed by: HOSPITALIST

## 2018-05-15 PROCEDURE — 93306 TTE W/DOPPLER COMPLETE: CPT

## 2018-05-15 PROCEDURE — 74011250637 HC RX REV CODE- 250/637: Performed by: PSYCHIATRY & NEUROLOGY

## 2018-05-15 PROCEDURE — A9540 TC99M MAA: HCPCS

## 2018-05-15 PROCEDURE — 83036 HEMOGLOBIN GLYCOSYLATED A1C: CPT | Performed by: HOSPITALIST

## 2018-05-15 PROCEDURE — 85027 COMPLETE CBC AUTOMATED: CPT | Performed by: HOSPITALIST

## 2018-05-15 PROCEDURE — G8978 MOBILITY CURRENT STATUS: HCPCS

## 2018-05-15 PROCEDURE — 97116 GAIT TRAINING THERAPY: CPT

## 2018-05-15 PROCEDURE — 97161 PT EVAL LOW COMPLEX 20 MIN: CPT

## 2018-05-15 PROCEDURE — G8979 MOBILITY GOAL STATUS: HCPCS

## 2018-05-15 PROCEDURE — 99218 HC RM OBSERVATION: CPT

## 2018-05-15 PROCEDURE — 36415 COLL VENOUS BLD VENIPUNCTURE: CPT | Performed by: HOSPITALIST

## 2018-05-15 RX ORDER — SODIUM CHLORIDE 0.9 % (FLUSH) 0.9 %
20 SYRINGE (ML) INJECTION
Status: COMPLETED | OUTPATIENT
Start: 2018-05-15 | End: 2018-05-15

## 2018-05-15 RX ORDER — ATORVASTATIN CALCIUM 10 MG/1
10 TABLET, FILM COATED ORAL DAILY
Qty: 30 TAB | Refills: 0 | Status: SHIPPED | OUTPATIENT
Start: 2018-05-16

## 2018-05-15 RX ORDER — GUAIFENESIN 100 MG/5ML
81 LIQUID (ML) ORAL DAILY
Qty: 30 TAB | Refills: 0 | Status: SHIPPED | OUTPATIENT
Start: 2018-05-16

## 2018-05-15 RX ORDER — ATORVASTATIN CALCIUM 10 MG/1
10 TABLET, FILM COATED ORAL DAILY
Status: DISCONTINUED | OUTPATIENT
Start: 2018-05-15 | End: 2018-05-15 | Stop reason: HOSPADM

## 2018-05-15 RX ADMIN — ATORVASTATIN CALCIUM 10 MG: 10 TABLET, FILM COATED ORAL at 14:23

## 2018-05-15 RX ADMIN — ENALAPRIL MALEATE 5 MG: 5 TABLET ORAL at 08:47

## 2018-05-15 RX ADMIN — ASPIRIN 81 MG 81 MG: 81 TABLET ORAL at 08:47

## 2018-05-15 RX ADMIN — Medication 10 ML: at 14:00

## 2018-05-15 RX ADMIN — Medication 20 ML: at 08:42

## 2018-05-15 RX ADMIN — Medication 10 ML: at 06:00

## 2018-05-15 NOTE — PROGRESS NOTES
Reason for Admission: L-side facial, arm, leg numbness and tingling                     RRAT Score: 0                    Plan for utilizing home health: not homebound, no skilled need                        Likelihood of Readmission: low                          Transition of Care Plan: 59 y/o  -American female insured by [employer], presented to [facility] 20 hours ago for stroke-like symptoms. CM consult for \"discharge planning. \" OT has seen and cleared patient, patient awaiting PT eval. CM s/w patient at bedside, confirmed demographics, patient independent with ADL/IADL's, has cane at home from previous injury however generally uses no DME, drives, lives with . Patient states that she feels \"90% improved\" since initial presentation, does not anticipate having and pos-discharge skilled needs. Patient will return home with her  when medically clear, will follow-up with [facility] PCP, no further questions/concerns for this writer. Discharge home with no CM needs.      MARILEE Casarez

## 2018-05-15 NOTE — PROGRESS NOTES
Physical Therapy Note:    Consult received, chart reviewed and nursing consulted. RN notes patient with new stat order for LE doppler to r/o DVT. Will defer PT evaluation at this time until results available.       Babar Watson MS, PT

## 2018-05-15 NOTE — PROGRESS NOTES
Patient is resting in bed no complaints of numbness or tingling in hands, legs or face. She has pain behind her left knee. Neuro assessment is WNL at this time. Will continue to monitor patient for changes in her condition. 2330 patient assisted to the bathroom pain is still behind her left knee no other complaints at this time.      0330 assisted patient to bathroom her left leg still hurts behind the knee when ambulating her neuro assessment in WNL no other complaints of pain or discomfort at this time blood drawn and sent to lab

## 2018-05-15 NOTE — PROGRESS NOTES
physical Therapy EVALUATION/DISCHARGE  Patient: Avtar Hunt (6 y.o. female)  Date: 5/15/2018  Primary Diagnosis: Numbness and tingling of left side of face        Precautions:      ASSESSMENT :  Based on the objective data described below, the patient presents with left LE/posterior knee pain and perceived LLE weakness causing mild decrease in ambulation/activity tolerance s/p admission for CVA work-up after onset of L sided numbness and tingling and LLE pain. Patient cleared for OOB by RN and note that recent doppler negative for DVT. Patient overall independent for functional mobility but did have 1-2 episodes of slight LOB (self-corrected with stepping strategy) due to c/o pain in her LLE and weakness causing her to compensate with overshifting to the R. Patient has been using a SPC yesterday 2/2 the L leg pain and demonstrated improved gait pattern with support on the R and offloading. Able to tolerate 300' ambulation with Mod Indep and up and down 24 steps with one rail with supervision. Strength testing, ROM, coordination and sensation are all WNL and BANEGAS balance score of 51/56 indicates patient is a low fall risk. Patient lives with her  in a one level apartment that requires climbing 21 steps to enter. PTA she was indep with all functional mobility without deficits of need for assistance, works as an . Based upon today's assessment, patient is cleared for d/c home as symptoms in LLE are improved and no significant safety concerns with patient using SPC. She would benefit from orthopedic consult in the outpatient setting to further investigate her symptoms as presentation could be consistent with radicular symptoms and/or local to the knee. Skilled physical therapy is not indicated at this time.      PLAN :  Discharge Recommendations: Outpatient Orthopedic consult  Further Equipment Recommendations for Discharge: SPC (has)     SUBJECTIVE:   Patient stated I just wish I knew why this leg felt this way.     OBJECTIVE DATA SUMMARY:   HISTORY:    Past Medical History:   Diagnosis Date    Diabetes [facility])      Past Surgical History:   Procedure Laterality Date    HX GYN  2007    hysterectomy    HX ORTHOPAEDIC  2011    double wrist    Felicia Chavis    removal of left glands armpit      Prior Level of Function/Home Situation: Indep PTA, no AD use until yesterday 2/2 LLE pain. Works as an   Personal factors and/or comorbidities impacting plan of care:     [address] [facility] Environment: Apartment  # Steps to Enter: 21  One/Two Story Residence:  (2nd floor apt)  Living Alone: No  Support Systems: Spouse/Significant Other/Partner  Patient Expects to be Discharged to[de-identified] Private residence  Current DME Used/Available at Home: andrea Churchill    EXAMINATION/PRESENTATION/DECISION MAKING:   Critical Behavior:  Neurologic State: Alert  Orientation Level: Oriented X4  Cognition: Appropriate decision making     Hearing: Auditory  Auditory Impairment: None  Skin:  See nursing notes  Edema: none  Range Of Motion:  AROM: Within functional limits                       Strength:    Strength:  Within functional limits                    Tone & Sensation:   Tone: Normal              Sensation: Intact               Coordination:  Coordination: Within functional limits  Vision:      Functional Mobility:  Bed Mobility:  Rolling: Independent  Supine to Sit: Independent        Transfers:  Sit to Stand: Independent  Stand to Sit: Independent        Bed to Chair: Supervision              Balance:   Sitting: Intact  Standing: Intact  Ambulation/Gait Training:  Distance (ft): 300 Feet (ft)  Assistive Device: Gait belt  Ambulation - Level of Assistance: Modified independent;Supervision (Supervision without AD, Mod Indep with cane/support)        Gait Abnormalities: Antalgic        Base of Support: Shift to right  Stance: Right increased  Speed/Sallie: Pace decreased (<100 feet/min)  Step Length: Right shortened;Left shortened                         Stairs:  Number of Stairs Trained: 24  Stairs - Level of Assistance: Supervision ; Additional time   Rail Use: Left          Functional Measure:  Orozco Balance Test:    Sitting to Standin  Standing Unsupported: 4  Sitting with Back Unsupported: 4  Standing to Sittin  Transfers: 4  Standing Unsupported with Eyes Closed: 4  Standing Unsupported with Feet Together: 4  Reach Forward with Outstretched Arm: 4   Object: 3  Turn to Look Over Shoulders: 3  Turn 360 Degrees: 4  Alternate Foot on Step/Stool: 4  Standing Unsupported One Foot in Front: 2  Stand on One Leg: 3  Total: 51         56=Maximum possible score;   0-20=High fall risk  21-40=Moderate fall risk   41-56=Low fall risk     Orozco Balance Test and G-code impairment scale:  Percentage of Impairment CH    0%   CI    1-19% CJ    20-39% CK    40-59% CL    60-79% CM    80-99% CN     100%   Orozco   Score 0-56 56 45-55 34-44 23-33 12-22 1-11 0           G codes: In compliance with CMSs Claims Based Outcome Reporting, the following G-code set was chosen for this patient based on their primary functional limitation being treated: The outcome measure chosen to determine the severity of the functional limitation was the Nur with a score of 51/56 which was correlated with the impairment scale.     ? Mobility - Walking and Moving Around:     - CURRENT STATUS: CI - 1%-19% impaired, limited or restricted    - GOAL STATUS: CI - 1%-19% impaired, limited or restricted    - D/C STATUS:  CI - 1%-19% impaired, limited or restricted        Physical Therapy Evaluation Charge Determination   History Examination Presentation Decision-Making   MEDIUM  Complexity : 1-2 comorbidities / personal factors will impact the outcome/ POC  MEDIUM Complexity : 3 Standardized tests and measures addressing body structure, function, activity limitation and / or participation in recreation MEDIUM Complexity : Evolving with changing characteristics  Other outcome measures Orozco 51/56  LOW       Based on the above components, the patient evaluation is determined to be of the following complexity level: LOW     Pain:  Pain Scale 1: Numeric (0 - 10)  Pain Intensity 1: 0     Activity Tolerance:   Functional but slow - 300' with MOD indep with SPC or SUP without, 21 steps    Please refer to the flowsheet for vital signs taken during this treatment. After treatment:   []   Patient left in no apparent distress sitting up in chair  [x]   Patient left in no apparent distress in bed  [x]   Call bell left within reach  [x]   Nursing notified  [x]   Caregiver present  []   Bed alarm activated    COMMUNICATION/EDUCATION:   Communication/Collaboration:  [x]   Fall prevention education was provided and the patient/caregiver indicated understanding. [x]   Patient/family have participated as able and agree with findings and recommendations. []   Patient is unable to participate in plan of care at this time.   Findings and recommendations were discussed with: Registered Nurse    Thank you for this referral.  Ramonita Ontiveros, PT   Time Calculation: 27 mins

## 2018-05-15 NOTE — PROGRESS NOTES
Problem: Patient Education: Go to Patient Education Activity  Goal: Patient/Family Education  Outcome: Progressing Towards Goal  Patient educated on DM and the importance of monitoring and maintaining BG control    Comments: Patient educated on BG control and diet

## 2018-05-15 NOTE — DISCHARGE INSTRUCTIONS
Discharge Instructions       PATIENT ID: Grayson Garrido  MRN: [id number]   YOB: 1960    DATE OF ADMISSION: 5/14/2018  3:02 PM    DATE OF DISCHARGE: 5/15/2018    PRIMARY CARE PROVIDER: [facility]     ATTENDING PHYSICIAN: Ramu Harvey MD  DISCHARGING PROVIDER: Ramu Harvey MD    To contact this individual call [phone] and ask the  to page. If unavailable ask to be transferred the Adult Hospitalist Department. DISCHARGE DIAGNOSES   TIA    CONSULTATIONS: IP CONSULT TO NEUROLOGY    PROCEDURES/SURGERIES: * No surgery found *    PENDING TEST RESULTS:   At the time of discharge the following test results are still pending:     FOLLOW UP APPOINTMENTS:   Follow-up Information     Follow up With Details Comments Contact Info    Pcp [facility] In 1 week hospital discharge follow up. [address] [facility] Wendi Davey MD In 2 weeks left knee pain [address] [address]  [phone]      [facility] Schedule an appointment as soon as possible for a visit in 4 weeks TIA follow up. [address] [place] [address]  [phone]           ADDITIONAL CARE RECOMMENDATIONS:   1. Schedule follow up appointmnets  2. Follow up with ortho for knee pain. DIET: Diabetic Diet  Oral Nutritional Supplements:       ACTIVITY: Activity as tolerated      DISCHARGE MEDICATIONS:   See Medication Reconciliation Form    · It is important that you take the medication exactly as they are prescribed. · Keep your medication in the bottles provided by the pharmacist and keep a list of the medication names, dosages, and times to be taken in your wallet. · Do not take other medications without consulting your doctor. NOTIFY YOUR PHYSICIAN FOR ANY OF THE FOLLOWING:   Fever over 101 degrees for 24 hours.    Chest pain, shortness of breath, fever, chills, nausea, vomiting, diarrhea, change in mentation, falling, weakness, bleeding. Severe pain or pain not relieved by medications. Or, any other signs or symptoms that you may have questions about.       DISPOSITION:   x Home With:   OT  PT  HH  RN       SNF/Inpatient Rehab/LTAC    Independent/assisted living    Hospice    Other:     CDMP Checked:   Yes x     PROBLEM LIST Updated:  Yes x       Signed:   Tiesha Vines MD  5/15/2018  4:15 PM

## 2018-05-15 NOTE — PROGRESS NOTES
Problem: Self Care Deficits Care Plan (Adult)  Goal: *Acute Goals and Plan of Care (Insert Text)  Occupational Therapy EVALUATION/discharge  Patient: Mack Elizondo (46 y.o. female)  Date: 5/15/2018  Primary Diagnosis: Numbness and tingling of left side of face        Precautions: universal       ASSESSMENT:   Based on the objective data described below, the patient presents at independent level with self-care and supervision with mobility. Per pt, everything has resolved except she is still having some L foot p! which has improved today 2/2 able to  place foot flat on floor with ambulation. Pt has 21 steps to access 2nd floor apt. Slightly unsteady on feet. PT to address ambulation and stairs. No further recommendations at this time. Further skilled acute occupational therapy is not indicated at this time. Discharge Recommendations: Home  Further Equipment Recommendations for Discharge: none noted      SUBJECTIVE:   Patient stated I can now place my foot flat on the floor without pain. I couldn't do that yesterday.     OBJECTIVE DATA SUMMARY:   HISTORY:   Past Medical History:   Diagnosis Date    Diabetes [facility])      Past Surgical History:   Procedure Laterality Date    HX GYN  2007    hysterectomy    HX ORTHOPAEDIC  2011    double wrist    Fili Ohm Dr. Raul Romero    removal of left glands armpit        Prior Level of Function/Environment/Context: independent, working full-time  Occupations in which the patient is/was successful, what are the barriers preventing that success:   Performance Patterns (routines, roles, habits, and rituals):   Personal Interests and/or values:   Expanded or extensive additional review of patient history:     Home Situation  Home Environment: Apartment  # Steps to Enter: 21  One/Two Story Residence:  (2nd floor apt)  Living Alone: No  Support Systems: Spouse/Significant Other/Partner  []  Right hand dominant   []  Left hand dominant    EXAMINATION OF PERFORMANCE DEFICITS:  Cognitive/Behavioral Status:  Neurologic State: Alert  Orientation Level: Oriented X4  Cognition: Appropriate decision making             Skin: intact    Edema: none noted    Hearing: Auditory  Auditory Impairment: None    Vision/Perceptual:     intact, wears glasses                                Range of Motion:    AROM: Within functional limits                         Strength:    Strength: Within functional limits                Coordination:  Coordination: Within functional limits  Fine Motor Skills-Upper: Left Intact; Right Intact    Gross Motor Skills-Upper: Left Intact; Right Intact    Tone & Sensation:    Tone: Normal  Sensation: Intact                      Balance:  Sitting: Intact  Standing: Intact    Functional Mobility and Transfers for ADLs:  Bed Mobility:  Rolling: Independent  Supine to Sit: Independent    Transfers:  Sit to Stand: Independent  Stand to Sit: Independent  Bed to Chair: Supervision  Toilet Transfer : Supervision    ADL Assessment:  Feeding: Independent    Oral Facial Hygiene/Grooming: Independent    Bathing: Supervision    Upper Body Dressing: Independent    Lower Body Dressing: Independent    Toileting: Supervision                Functional Measure:  Barthel Index:    Bathin  Bladder: 10  Bowels: 10  Groomin  Dressing: 10  Feeding: 10  Mobility: 15  Stairs: 5  Toilet Use: 10  Transfer (Bed to Chair and Back): 15  Total: 95       Barthel and G-code impairment scale:  Percentage of impairment CH  0% CI  1-19% CJ  20-39% CK  40-59% CL  60-79% CM  80-99% CN  100%   Barthel Score 0-100 100 99-80 79-60 59-40 20-39 1-19   0   Barthel Score 0-20 20 17-19 13-16 9-12 5-8 1-4 0      The Barthel ADL Index: Guidelines  1. The index should be used as a record of what a patient does, not as a record of what a patient could do. 2. The main aim is to establish degree of independence from any help, physical or verbal, however minor and for whatever reason.   3. The need for supervision renders the patient not independent. 4. A patient's performance should be established using the best available evidence. Asking the patient, friends/relatives and nurses are the usual sources, but direct observation and common sense are also important. However direct testing is not needed. 5. Usually the patient's performance over the preceding 24-48 hours is important, but occasionally longer periods will be relevant. 6. Middle categories imply that the patient supplies over 50 per cent of the effort. 7. Use of aids to be independent is allowed. Margo Matos, Barthel, D.WYi (7412). Functional evaluation: the Barthel Index. [facility] (14)2. Aziza Aceves aaron ALANA Love, Girish Hardwick., Megan Doll., Temi Sar, [address] (1999). Measuring the change indisability after inpatient rehabilitation; comparison of the responsiveness of the Barthel Index and Functional Nuckolls Measure. Journal of Neurology, Neurosurgery, and Psychiatry, 66(4), 174-548. Teresa German, N.J.A, EDILBERTO Aguiar, & Jody Peterson MYiA. (2004.) Assessment of post-stroke quality of life in cost-effectiveness studies: The usefulness of the Barthel Index and the EuroQoL-5D. Quality of Life Research, 13, 409-70       G codes: In compliance with CMSs Claims Based Outcome Reporting, the following G-code set was chosen for this patient based on their primary functional limitation being treated: The outcome measure chosen to determine the severity of the functional limitation was the Barthel with a score of 95/100 which was correlated with the impairment scale. ?  Self Care:     - CURRENT STATUS: CI - 1%-19% imparied, limited or restricted    - GOAL STATUS:    CI - 1%-19% impaired, limited or restricted    - D/C STATUS:  CI - 1%-19% impaired, limited or restricted     Occupational Therapy Evaluation Charge Determination   History Examination Decision-Making   LOW Complexity : Brief history review  LOW Complexity Tiffany  1-3 performance deficits relating to physical, cognitive , or psychosocial skils that result in activity limitations and / or participation restrictions  LOW Complexity : No comorbidities that affect functional and no verbal or physical assistance needed to complete eval tasks       Based on the above components, the patient evaluation is determined to be of the following complexity level: LOW   Pain:  Pain Scale 1: Numeric (0 - 10)  Pain Intensity 1: 0              Activity Tolerance:   Good  Please refer to the flowsheet for vital signs taken during this treatment. After treatment:   [x]  Patient left in no apparent distress sitting up in chair  []  Patient left in no apparent distress in bed  [x]  Call bell left within reach  []  Nursing notified  []  Caregiver present  []  Bed alarm activated    COMMUNICATION/EDUCATION:   Communication/Collaboration:  [x]      Home safety education was provided and the patient/caregiver indicated understanding. [x]      Patient/family have participated as able and agree with findings and recommendations. []      Patient is unable to participate in plan of care at this time.   Findings and recommendations were discussed with: Registered Nurse    Varinder Number, OTR/L  Time Calculation: 11 mins

## 2018-05-15 NOTE — PROGRESS NOTES
Speech pathology  Orders received, chart reviewed, and note patient came in with L side face numbness and tingling sensation in l shoulder and LLE. No reported changes in speech/language. She passed STAND and is on a regular diet/ thin liquids. Brain MRI negative for acute infarct. Formal speech/swallow eval not indicated at this time. Will complete orders. Please re-consult if any changes arise.    Thanks, Ashwini North M.S. CCC-SLP

## 2018-05-15 NOTE — DISCHARGE SUMMARY
Discharge Summary       PATIENT ID: Dion Holman  MRN: [id number]   YOB: 1960    DATE OF ADMISSION: 5/14/2018  3:02 PM    DATE OF DISCHARGE: 5/15/18   PRIMARY CARE PROVIDER: [facility]     ATTENDING PHYSICIAN: Javier Cuenca MD  DISCHARGING PROVIDER: Javier Cuenca MD    To contact this individual call [phone] and ask the  to page. If unavailable ask to be transferred the Adult Hospitalist Department. CONSULTATIONS: IP CONSULT TO NEUROLOGY    PROCEDURES/SURGERIES: * No surgery found *    ADMITTING DIAGNOSES & HOSPITAL COURSE:   TIA  This is a 65-year-old -American woman with past medical history significant for type 2 diabetes who referred by [facility] after she presented with left side of face numbness, tingling sensation involving the left face, left ear, left shoulder and left lower extremity. She had associated sweating and her coworker gave her aspirin 2 tablets initially and she got herself together and drove to [facility] and then she referred [facility] ED. Her tingling and numbness sensation of her left side of the face, shoulder, arm is resolved after 3 hours She still has left lower extremity numbness sensation. No left-sided chest pain, palpitation, shortness of breath, fever, cough, nausea, vomiting, abdominal pain, urinary complaint or abnormal bowel movement. No history of hypertension, arrhythmia, heart disease or chronic kidney disease. She does not smoke cigarettes. On arrival to ER her blood pressure was 149/81, pulse 81, temperature 98.1, saturation of oxygen 99% on room air. CT scan of the head without contrast was done and study:  No acute intracranial hemorrhage, mass or infarct. Chest x-ray ordered and the patient referred to hospitalist service for further evaluation and admission. She was under obs for TIA work up. She got CTA of head, neck, MRI, ECHO, US of LLE which were normal with below results.   Neurology evaluated her and recommended OP follow up. Her symptoms improved except for some left knee pain. Recommended OP ortho follow up. Her A1c was 9, instructed about blood sugar checks, and about diabetic diet, checking BP at home every day for further BP med adjustment by her PCP. ECHO:  Systolic function was normal. Ejection fraction was  estimated in the range of 55 % to 60 %. There were no regional wall motion  abnormalities. There was mild concentric hypertrophy. Atrial septum: There was no evidence of intracardiac shunt by  peripherally-administered agitated saline contrast.    MRI:  Normal MRI the brain for patient age.     There is no intracranial mass, hemorrhage or evidence of acute infarction    CTA of head and neck:  No acute intracranial process. [No aneurysm, dissection, major vessel occlusion or significant stenosis]        DISCHARGE DIAGNOSES / PLAN:      1. See above       PENDING TEST RESULTS:   At the time of discharge the following test results are still pending:     FOLLOW UP APPOINTMENTS:    Follow-up Information     Follow up With Details Comments Contact Info    [facility]cp [facility] In 1 week hospital discharge follow up. [address] [facility] Wendi Davey MD In 2 weeks left knee pain [address] [id number]  [address] [facility] Schedule an appointment as soon as possible for a visit in 4 weeks TIA follow up. [address] [facility] [address] [facility] [address]           ADDITIONAL CARE RECOMMENDATIONS:   1. Check blood sugars atleast 4 times daily  2. Check BP daily  3. Take above reading to PCP for further adjustment in diabetic and BP meds  4. Follow up with PCP, neurology, orthopedics.     DIET: Cardiac Diet and Diabetic Diet  Oral Nutritional Supplements:     ACTIVITY: Activity as tolerated        DISCHARGE MEDICATIONS:  Current Discharge Medication List      START taking these medications Details   aspirin 81 mg chewable tablet Take 1 Tab by mouth daily. Qty: 30 Tab, Refills: 0      atorvastatin (LIPITOR) 10 mg tablet Take 1 Tab by mouth daily. Qty: 30 Tab, Refills: 0         CONTINUE these medications which have NOT CHANGED    Details   ergocalciferol (VITAMIN D2) 50,000 unit capsule Take 50,000 Units by mouth every Sunday. albiglutide (TANZEUM) 50 mg/0.5 mL injector pen by SubCUTAneous route every Tuesday. insulin regular human (AFREZZA) 4 unit CtDv Take 16 Units by inhalation three (3) times daily (with meals). metFORMIN (GLUCOPHAGE) 500 mg tablet Take 500 mg by mouth nightly. enalapril (VASOTEC) 5 mg tablet Take  by mouth daily. insulin detemir (LEVEMIR) 100 unit/mL injection 30 Units by SubCUTAneous route nightly. NOTIFY YOUR PHYSICIAN FOR ANY OF THE FOLLOWING:   Fever over 101 degrees for 24 hours. Chest pain, shortness of breath, fever, chills, nausea, vomiting, diarrhea, change in mentation, falling, weakness, bleeding. Severe pain or pain not relieved by medications. Or, any other signs or symptoms that you may have questions about. DISPOSITION:  x  Home With:   OT  PT  HH  RN       Long term SNF/Inpatient Rehab    Independent/assisted living    Hospice    Other:       PATIENT CONDITION AT DISCHARGE: Stable and improved. Functional status    Poor     Deconditioned    x Independent      Cognition   x  Lucid     Forgetful     Dementia      Catheters/lines (plus indication)    [place]     PICC     PEG    x None      Code status    x Full code     DNR      PHYSICAL EXAMINATION AT DISCHARGE:  GENERAL:  The patient is alert, cooperative, not in distress. She appears her stated age. HEENT:  Pink conjunctivae, anicteric sclerae. Moist tongue and buccal mucosa. LUNGS:  Clear to auscultation bilaterally. CHEST WALL:  No tenderness or deformity. HEART:  Regular rate and rhythm, S1 and S2 normal.  No murmur or gallop. ABDOMEN:  Soft, nontender. Bowel sounds normal.  No mass or organomegaly. SKIN:  No rash or lesion. CENTRAL NERVOUS SYSTEM:  Conscious; well oriented to time, place and person. Motor 5/5. Sensation intact. Cranial nerves II-XII grossly intact.       CHRONIC MEDICAL DIAGNOSES:  Problem List as of 5/15/2018  Date Reviewed: 5/14/2018          Codes Class Noted - Resolved    * (Principal)TIA (transient ischemic attack) ICD-10-CM: G45.9  ICD-9-CM: 435.9  5/15/2018 - Present        Numbness and tingling of left side of face ICD-10-CM: R20.0, R20.2  ICD-9-CM: 782.0  5/14/2018 - Present        Hidradenitis axillaris ICD-10-CM: L73.2  ICD-9-CM: 705.83  11/6/2013 - Present              Greater than 35 minutes were spent with the patient on counseling and coordination of care    Signed:   Crystal Jones MD  5/15/2018  7:17 PM

## 2018-05-15 NOTE — PROCEDURES
[address]  *** FINAL REPORT ***    Name: Lambert Christensen  MRN: [id number]    Outpatient  : 1960  HIS Order #: [id number]  [address] [facility] Visit #: [id number]  Date: 15 May 2018    TYPE OF TEST: Peripheral Venous Testing    REASON FOR TEST  Pain in limb, Limb swelling    Left Leg:-  Deep venous thrombosis:           No  Superficial venous thrombosis:    No  Deep venous insufficiency:        Not examined  Superficial venous insufficiency: Not examined      INTERPRETATION/FINDINGS  PROCEDURE:  Color duplex ultrasound imaging of lower extremity veins. FINDINGS:       Right: The common femoral vein is patent and without evidence of  thrombus. Phasic flow is observed. This extremity was not otherwise  evaluated. Left:   The common femoral, deep femoral, femoral, popliteal,  posterior tibial, peroneal, and great saphenous are patent and without   evidence of thrombus;  each is fully compressible and there is no  narrowing of the flow channel on color Doppler imaging. Phasic flow  is observed in the common femoral vein. IMPRESSION:  No evidence of left lower extremity vein thrombosis. ADDITIONAL COMMENTS    I have personally reviewed the data relevant to the interpretation of  this  study.     TECHNOLOGIST: Anna Cramer RVT  Signed: 05/15/2018 01:27 PM    PHYSICIAN: Robbin Acuna MD  Signed: 2018 09:19 AM

## 2018-05-15 NOTE — PROGRESS NOTES
Problem: Patient Education: Go to Patient Education Activity  Goal: Patient/Family Education  Outcome: Progressing Towards Goal  Patient educated on importance of using the call bell for assistance    Comments: Call bell in reach room clutter free

## 2018-05-15 NOTE — PROGRESS NOTES
Spiritual Care Partner Volunteer visited patient in room 521/02 on 5.15.18. Documented by: : Rev. Hussain Armendariz.  Yohana Beverly; [facility], to contact [phone] call: [phone]

## 2018-05-15 NOTE — CONSULTS
NEUROLOGY CONSULT NOTE    Name Sharon Espinoza Age 60 y.o. MRN [id number]  1960     Consulting Physician: Annette Torres MD      Chief Complaint:  L paresthesias     Assessment:     Principal Problem:    TIA (transient ischemic attack) (5/15/2018)    Active Problems:    Numbness and tingling of left side of face (2018)      60year old Lesley Baptiste h/o DM presenting with sudden onset L sided paresthesias as well as L posterior knee pain. Paresthesias have resolved but LLE pain persist today. CTH/CTA completed on admission did not reveal any acute ischemia or significant vessel stenosis. MRI Brain WWO also performed and normal.  Presentation is concerning for TIA. Will also check LLE U/S to exclude possible DVT. No PFO on TTE. Recommendations:   ASA 81mg daily and low dose statin therapy for stroke prevention  U/S LLE r/o DVT  Stroke education  Goal SBP<140, LDL<70, HbA1C<7.0  No skilled needs  May discharge home today if LLE U/S is normal.    Please arrange for Neurology F/U 4 weeks    Thank you very much for this referral. I appreciate the opportunity to participate in this patient's care. History of Present Illness: This is a 60 y.o.  right handed  female, we were asked to see for L sided paresthesias. PMH notable for DM. Pt reports she awoke  in her normal state of health. She left for work and noted a sudden severe pain over the posterior L knee (deep arthritic type pain). Around 10:30AM, she noted LUE numbness which spread to involve her L face and L foot. She also noted b/l hand tingling. She denied focal weakness, headache, speech/vision deficits. She took 2 ASA prior to presenting to the ED. Sx resolved mostly after 6 hours. She denies recurrence of paresthesias since admission or similar episodes in the past.  She feels her gait is still somewhat unstable due to the pain localized to the posterior L knee which is tender to palpation.   CTH/CTA completed on admission did not reveal any acute ischemia or significant vessel stenosis. MRI Brain WWO also performed and normal.  LDL 72. HbA1C 9. She was not on AP/OAC prior to admission. She is currently maintained on ASA. Allergies   Allergen Reactions    Latex Rash    Ciprofloxacin Rash    Codeine Rash    Keflex [Cephalexin] Rash    Pcn [Penicillins] Rash     All cillins/keflex        Prior to Admission medications    Medication Sig Start Date End Date Taking? Authorizing Provider   ergocalciferol (VITAMIN D2) 50,000 unit capsule Take 50,000 Units by mouth every Sunday. Yes Historical Provider   albiglutide (TANZEUM) 50 mg/0.5 mL injector pen by SubCUTAneous route every Tuesday. Yes Historical Provider   insulin regular human (AFREZZA) 4 unit CtDv Take 16 Units by inhalation three (3) times daily (with meals). Yes Historical Provider   metFORMIN (GLUCOPHAGE) 500 mg tablet Take 500 mg by mouth nightly. Yes Historical Provider   enalapril (VASOTEC) 5 mg tablet Take  by mouth daily. Yes Historical Provider   insulin detemir (LEVEMIR) 100 unit/mL injection 30 Units by SubCUTAneous route nightly. Historical Provider       Past Medical History:   Diagnosis Date    Diabetes [facility])         Past Surgical History:   Procedure Laterality Date    HX GYN  2007    hysterectomy    HX ORTHOPAEDIC  2011    double wrist    Arlene Sotero Manuel    removal of left glands armpit         Social History   Substance Use Topics    Smoking status: Never Smoker    Smokeless tobacco: Not on file    Alcohol use Not on file      Comment: rarely        History reviewed. No pertinent family history. Review of Systems:   Comprehensive review of systems performed and negative except for as listed above.      Exam:     Visit Vitals    /71 (BP 1 Location: Left arm, BP Patient Position: At rest;Supine)    Pulse 82    Temp 98.1 °F (36.7 °C)    Resp 16    Ht 5' 5\" (1.651 m)    Wt 85.1 kg (187 lb 9.8 oz)    SpO2 98%    BMI 31.22 kg/m2        General: Well developed, well nourished. Patient in no apparent distress   Head: Normocephalic, atraumatic, anicteric sclera   Lungs:  Clear to auscultation bilaterally, No wheezes or rubs   Cardiac: Regular rate and rhythm with no murmurs. Abd: Bowel sounds were audible. No tenderness on palpation   Ext: No pedal edema   Skin: No overt signs of rash     Neurological Exam:  Mental Status: Alert and oriented to person place and time   Speech: Fluent no aphasia or dysarthria. Cranial Nerves:   Intact visual fields. Facial sensation is normal. Facial movement is symmetric. Palate is midline. Normal sternocleidomastoid strength. Tongue is midline. Hearing is intact bilaterally. Eyes: PERRL, EOM's full, no nystagmus, no ptosis. Motor:  Full and symmetric strength of upper and lower proximal and distal muscles. Normal bulk and tone. Reflexes:   Deep tendon reflexes 1+/4 and symmetrical.  Plantar response is downgoing b/l. Sensory:   Symmetrically intact  with no perceived deficits modalities involving small or large fibers. Gait:  Gait is balanced, slightly antalgic due to L posterior knee pain   Tremor:   No tremor noted. Cerebellar:  Finger to nose and heel over shin to knee was demonstrated competently. Neurovascular: No carotid bruits. Imaging  CT Results (maximum last 3): Results from Hospital Encounter encounter on 05/14/18   CTA NECK   Narrative CLINICAL HISTORY: Left facial numbness    INDICATION:  Left facial numbness    EXAMINATION:  CT ANGIOGRAPHY HEAD AND NECK     COMPARISON: Correlation with CT head 5/14/2018, MR brain 5/14/2019     TECHNIQUE:    Following the uneventful administration of Isovue-370 contrast material, axial  CT angiography of the head and neck was performed. Coronal and sagittal  reconstructions were obtained. Manual postprocessing of images was performed.   3-D  Sagittal maximal intensity projection images were obtained. 3-D Coronal maximal intensity projections were obtained. CT dose reduction was achieved through use of a standardized protocol tailored  for this examination and automatic exposure control for dose modulation. FINDINGS:    No pulmonary mass or nodule. No evidence of pulmonary embolism. No large thyroid  lesion. Vertebral artery origins are normal. Vertebral basilar system is mildly  diminutive in size. Garth Castro No intracranial hemorrhage. No intracranial mass. No  significant paranasal sinus disease. .  The basal cisterns are clear. The  paranasal sinuses are clear. CTA NECK:  There is 0% stenosis in the right internal carotid artery utilizing NASCET  criteria. There is 0% stenosis in the left internal carotid artery utilizing NASCET  criteria. The lung apices are clear. CTA HEAD:  A 2 segments are within normal limits. There are A1 segments bilaterally. M1  segments are patent. M2 segments demonstrate symmetric arborization. The  vertebrobasilar system is diminutive in size. P1 and P2 segments are patent. .  The basilar artery and its branches are normal. The internal carotid, anterior  cerebral, and middle cerebral arteries are patent. There is no flow-limiting  intracranial stenosis. There is no aneurysm. There are small bilateral posterior  communicating arteries. Impression IMPRESSION:  No acute intracranial process. [No aneurysm, dissection, major vessel occlusion or significant stenosis]                              CTA HEAD   Narrative *PRELIMINARY REPORT*    There is no major central intracranial vascular occlusion. The carotid arteries are patent. The middle cerebral arteries and the M1 branches are patent. The vertebral and basilar arteries are patent. There is no intracranial hemorrhage. Preliminary report was provided by Dr. Blake Villegas, the on-call radiologist, at 9:35  PM    Final report to follow.     *END PRELIMINARY REPORT*    CLINICAL HISTORY: Left facial numbness    INDICATION:  Left facial numbness    EXAMINATION:  CT ANGIOGRAPHY HEAD AND NECK     COMPARISON: Correlation with CT head 5/14/2018, MR brain 5/14/2019     TECHNIQUE:    Following the uneventful administration of Isovue-370 contrast material, axial  CT angiography of the head and neck was performed. Coronal and sagittal  reconstructions were obtained. Manual postprocessing of images was performed. 3-D  Sagittal maximal intensity projection images were obtained. 3-D Coronal maximal intensity projections were obtained. CT dose reduction was achieved through use of a standardized protocol tailored  for this examination and automatic exposure control for dose modulation. FINDINGS:    No pulmonary mass or nodule. No evidence of pulmonary embolism. No large thyroid  lesion. Vertebral artery origins are normal. Vertebral basilar system is mildly  diminutive in size. Lavada Saucer No intracranial hemorrhage. No intracranial mass. No  significant paranasal sinus disease. .  The basal cisterns are clear. The  paranasal sinuses are clear. CTA NECK:  There is 0% stenosis in the right internal carotid artery utilizing NASCET  criteria. There is 0% stenosis in the left internal carotid artery utilizing NASCET  criteria. The lung apices are clear. CTA HEAD:  A 2 segments are within normal limits. There are A1 segments bilaterally. M1  segments are patent. M2 segments demonstrate symmetric arborization. The  vertebrobasilar system is diminutive in size. P1 and P2 segments are patent. .  The basilar artery and its branches are normal. The internal carotid, anterior  cerebral, and middle cerebral arteries are patent. There is no flow-limiting  intracranial stenosis. There is no aneurysm. There are small bilateral posterior  communicating arteries. Impression IMPRESSION:  No acute intracranial process.   [No aneurysm, dissection, major vessel occlusion or significant stenosis]                  CT CODE NEURO HEAD WO CONTRAST   Narrative INDICATION: tingling to left side     Exam: Noncontrast CT of the brain is performed with 5 mm collimation. CT dose reduction was achieved with the use of the standardized protocol  tailored for this examination and automatic exposure control for dose  modulation. Adaptive statistical iterative reconstruction (ASIR) was utilized. FINDINGS: There is no acute intracranial hemorrhage, mass, mass effect or  herniation. Ventricular system is normal. The gray-white matter differentiation  is well-preserved. The mastoid air cells are well pneumatized. The visualized  paranasal sinuses are normal.         Impression IMPRESSION: No acute intracranial hemorrhage, mass or infarct. MRI Results (maximum last 3): Results from [place] encounter on 05/14/18   MRI BRAIN W WO CONT   Narrative CLINICAL HISTORY: TIA    INDICATION: TIA. COMPARISON: CT head 5/14/2013    TECHNIQUE: MR examination of the brain includes axial and sagittal T1  pre-and-post contrast, axial T2, axial FLAIR, axial gradient echo, axial DWI,  coronal T1 postcontrast.  CONTRAST: The patient was then administered gadolinium-based contrast material  axial and coronal T1-weighted postcontrast enhanced imaging was obtained. FINDINGS:   There is no intracranial mass, hemorrhage or evidence of acute infarction. Minimal foci of increased T2 signal intensity in the corona radiata. Likely of  no clinical significance. Tiny posterior communicator artery on the right. Vertebrobasilar system is diminutive in size. .  There is no Chiari or syrinx. The pituitary and infundibulum are grossly  unremarkable. There is no skull base mass. Cerebellopontine angles are grossly  unremarkable. The major intracranial vascular flow-voids are unremarkable. There is no abnormal parenchymal enhancement. There is no abnormal meningeal  enhancement. The cavernous sinuses are symmetric. Optic chiasm and infundibulum  grossly unremarkable. Orbits are grossly symmetric. Dural venous sinuses are  grossly patent. The brain architecture is normal. There is no evidence of  midline shift or mass-effect. There are no extra-axial fluid collections. The mastoid air cells are well pneumatized and clear. Impression IMPRESSION:  Normal MRI the brain for patient age. There is no intracranial mass, hemorrhage or evidence of acute infarction            Lab Review  Lab Results   Component Value Date/Time    WBC 8.0 05/15/2018 03:20 AM    HCT 40.2 05/15/2018 03:20 AM    HGB 13.3 05/15/2018 03:20 AM    PLATELET 248 52/40/7570 03:20 AM     Lab Results   Component Value Date/Time    Sodium 141 05/15/2018 03:20 AM    Potassium 3.8 05/15/2018 03:20 AM    Chloride 108 05/15/2018 03:20 AM    CO2 26 05/15/2018 03:20 AM    Glucose 139 (H) 05/15/2018 03:20 AM    BUN 12 05/15/2018 03:20 AM    Creatinine 0.79 05/15/2018 03:20 AM    Calcium 8.7 05/15/2018 03:20 AM     No components found for: TROPQUANT  No results found for: MAGGI    Signed:  Sherin Bunn DO  5/15/2018  1:25 PM

## 2018-08-06 ENCOUNTER — OFFICE VISIT (OUTPATIENT)
Dept: NEUROLOGY | Age: 58
End: 2018-08-06

## 2018-08-06 VITALS
BODY MASS INDEX: 31.12 KG/M2 | HEART RATE: 78 BPM | SYSTOLIC BLOOD PRESSURE: 154 MMHG | WEIGHT: 187 LBS | RESPIRATION RATE: 18 BRPM | DIASTOLIC BLOOD PRESSURE: 88 MMHG

## 2018-08-06 DIAGNOSIS — R20.0 NUMBNESS AND TINGLING OF LEFT SIDE OF FACE: ICD-10-CM

## 2018-08-06 DIAGNOSIS — G45.9 TRANSIENT CEREBRAL ISCHEMIA, UNSPECIFIED TYPE: Primary | ICD-10-CM

## 2018-08-06 DIAGNOSIS — R20.2 NUMBNESS AND TINGLING OF LEFT SIDE OF FACE: ICD-10-CM

## 2018-08-06 DIAGNOSIS — E11.49 TYPE 2 DIABETES MELLITUS WITH OTHER NEUROLOGIC COMPLICATION, UNSPECIFIED WHETHER LONG TERM INSULIN USE (HCC): ICD-10-CM

## 2018-08-06 NOTE — PATIENT INSTRUCTIONS

## 2018-08-06 NOTE — MR AVS SNAPSHOT
[address] 
[phone] Patient: Jayy Pearl MRN: [id number] Visit Information Date & Time Provider Department Dept. Phone Encounter #  
 2018  3:40 PM Suzie Gomez, [address] [facility] at [address] [id number] Follow-up Instructions Return if symptoms worsen or fail to improve. Upcoming Health Maintenance Date Due Hepatitis C Screening 1960 DTaP/Tdap/Td series (1 - Tdap) 1980 PAP AKA CERVICAL CYTOLOGY 1980 FOBT Q 1 YEAR AGE 50-75 2009 BREAST CANCER SCRN MAMMOGRAM 2013 Influenza Age 5 to Adult 2018 Allergies as of 2018  Review Complete On: 2018 By: Suzie Gomez, DO Severity Noted Reaction Type Reactions Latex  2018    Rash Ciprofloxacin  2018    Rash Codeine  2018    Rash Keflex [Cephalexin]  2018    Rash Pcn [Penicillins]  2013    Rash All cillins/keflex Current Immunizations  Reviewed on 5/15/2018 No immunizations on file. Not reviewed this visit You Were Diagnosed With   
  
 Codes Comments Transient cerebral ischemia, unspecified type    -  Primary ICD-10-CM: G45.9 ICD-9-CM: 435.9 Numbness and tingling of left side of face     ICD-10-CM: R20.0, R20.2 ICD-9-CM: 162. 0 Type 2 diabetes mellitus with other neurologic complication, unspecified whether long term insulin use (HCC)     ICD-10-CM: E11.49 
ICD-9-CM: 250.60 Vitals BP Pulse Resp Weight(growth percentile) BMI OB Status 154/88 78 18 187 lb (84.8 kg) 31.12 kg/m2 Menopause Smoking Status Never Smoker Vitals History BMI and BSA Data Body Mass Index Body Surface Area  
 31.12 kg/m 2 1.97 m 2 Preferred Pharmacy Pharmacy Name Phone [facility] [address] [place] [facility] - [address], [place] [address] [facility] 20 [phone] Your Updated Medication List  
  
   
This list is accurate as of 8/6/18  3:45 PM.  Always use your most recent med list.  
  
  
  
  
 Suzie Godoy 4 unit Ctdv Generic drug:  insulin regular human Take 16 Units by inhalation three (3) times daily (with meals). aspirin 81 mg chewable tablet Take 1 Tab by mouth daily. atorvastatin 10 mg tablet Commonly known as:  LIPITOR Take 1 Tab by mouth daily. enalapril 5 mg tablet Commonly known as:  Esta Honer Take  by mouth daily. LEVEMIR U-100 INSULIN 100 unit/mL injection Generic drug:  insulin detemir U-100  
30 Units by SubCUTAneous route nightly. metFORMIN 500 mg tablet Commonly known as:  GLUCOPHAGE Take 500 mg by mouth nightly. TANZEUM 50 mg/0.5 mL injector pen Generic drug:  albiglutide  
by SubCUTAneous route every Tuesday. VITAMIN D2 50,000 unit capsule Generic drug:  ergocalciferol Take 50,000 Units by mouth every Sunday. XIGDUO XR  mg Tbph  
Generic drug:  dapagliflozin-metformin Take  by mouth. Follow-up Instructions Return if symptoms worsen or fail to improve. Patient Instructions A Healthy Lifestyle: Care Instructions Your Care Instructions A healthy lifestyle can help you feel good, stay at a healthy weight, and have plenty of energy for both work and play. A healthy lifestyle is something you can share with your whole family. A healthy lifestyle also can lower your risk for serious health problems, such as high blood pressure, heart disease, and diabetes. You can follow a few steps listed below to improve your health and the health of your family. Follow-up care is a key part of your treatment and safety. Be sure to make and go to all appointments, and call your doctor if you are having problems.  It's also a good idea to know your test results and keep a list of the medicines you take. How can you care for yourself at home? · Do not eat too much sugar, fat, or fast foods. You can still have dessert and treats now and then. The goal is moderation. · Start small to improve your eating habits. Pay attention to portion sizes, drink less juice and soda pop, and eat more fruits and vegetables. ¨ Eat a healthy amount of food. A 3-ounce serving of meat, for example, is about the size of a deck of cards. Fill the rest of your plate with vegetables and whole grains. ¨ Limit the amount of soda and sports drinks you have every day. Drink more water when you are thirsty. ¨ Eat at least 5 servings of fruits and vegetables every day. It may seem like a lot, but it is not hard to reach this goal. A serving or helping is 1 piece of fruit, 1 cup of vegetables, or 2 cups of leafy, raw vegetables. Have an apple or some carrot sticks as an afternoon snack instead of a candy bar. Try to have fruits and/or vegetables at every meal. 
· Make exercise part of your daily routine. You may want to start with simple activities, such as walking, bicycling, or slow swimming. Try to be active 30 to 60 minutes every day. You do not need to do all 30 to 60 minutes all at once. For example, you can exercise 3 times a day for 10 or 20 minutes. Moderate exercise is safe for most people, but it is always a good idea to talk to your doctor before starting an exercise program. 
· Keep moving. Zaina Bevels the lawn, work in the garden, or [place]. Take the stairs instead of the elevator at work. · If you smoke, quit. People who smoke have an increased risk for heart attack, stroke, cancer, and other lung illnesses. Quitting is hard, but there are ways to boost your chance of quitting tobacco for good. ¨ Use nicotine gum, patches, or lozenges. ¨ Ask your doctor about stop-smoking programs and medicines. ¨ Keep trying.  
In addition to reducing your risk of diseases in the future, you will notice some benefits soon after you stop using tobacco. If you have shortness of breath or asthma symptoms, they will likely get better within a few weeks after you quit. · Limit how much alcohol you drink. Moderate amounts of alcohol (up to 2 drinks a day for men, 1 drink a day for women) are okay. But drinking too much can lead to liver problems, high blood pressure, and other health problems. Family health If you have a family, there are many things you can do together to improve your health. · Eat meals together as a family as often as possible. · Eat healthy foods. This includes fruits, vegetables, lean meats and dairy, and whole grains. · Include your family in your fitness plan. Most people think of activities such as jogging or tennis as the way to fitness, but there are many ways you and your family can be more active. Anything that makes you breathe hard and gets your heart pumping is exercise. Here are some tips: 
¨ Walk to do errands or to take your child to school or the bus. ¨ Go for a family bike ride after dinner instead of watching TV. Where can you learn more? Go to http://sarwatPlumgeovanna.info/. Enter B957 in the search box to learn more about \"A Healthy Lifestyle: Care Instructions. \" Current as of: December 7, 2017 Content Version: 11.7 © 3133-3716 Healthwise, Incorporated. Care instructions adapted under license by [employer] (which disclaims liability or warranty for this information). If you have questions about a medical condition or this instruction, always ask your healthcare professional. [place] [address] any warranty or liability for your use of this information. Introducing [facility]! [facility] introduces [employer] patient portal. Now you can access parts of your medical record, email your doctor's office, and request medication refills online.    
 
1. In your internet browser, go to [email or web address] 2. Click on the First Time User? Click Here link in the Sign In box. You will see the New Member Sign Up page. 3. Enter your [employer] Access Code exactly as it appears below. You will not need to use this code after youve completed the sign-up process. If you do not sign up before the expiration date, you must request a new code. · [employer] Access Code: [email or web address] Expires: 8/12/2018  3:22 PM 
 
4. Enter the last four digits of your Social Security Number (xxxx) and Date of Birth (mm/dd/yyyy) as indicated and click Submit. You will be taken to the next sign-up page. 5. Create a "[employer]t ID. This will be your [employer] login ID and cannot be changed, so think of one that is secure and easy to remember. 6. Create a [employer] password. You can change your password at any time. 7. Enter your Password Reset Question and Answer. This can be used at a later time if you forget your password. 8. Enter your e-mail address. You will receive e-mail notification when new information is available in [address] 9. Click Sign Up. You can now view and download portions of your medical record. 10. Click the Download Summary menu link to download a portable copy of your medical information. If you have questions, please visit the Frequently Asked Questions section of the [employer] website. Remember, [employer] is NOT to be used for urgent needs. For medical emergencies, dial 911. Now available from your iPhone and Android! Please provide this summary of care documentation to your next provider. Your primary care clinician is listed as PCP PATIENT FIRST. If you have any questions after today's visit, please call [phone].

## 2018-08-06 NOTE — PROGRESS NOTES
Neurology Clinic Follow up Note    Patient ID:  Belem Saavedra  [id number]  59 y.o.  1960      Ms. Lit Garcia is here for follow up today of TIA/L paresthesias       Last Appointment With Me:  Hospital F/U 5/2018    Interval History:   Patient returns for hospital f/u L paresthesias 5/14/18. Denies recurrence of paresthesias or other focal deficits since discharge. Compliant with ASA. LDL 72- she is controlling this with diet. PMHx/ PSHx/ FHx/ SHx:  Reviewed and unchanged previous visit. Past Medical History:   Diagnosis Date    Diabetes ([facility] [address]          ROS:  Comprehensive review of systems negative except for as noted above. Objective:       Meds:  Current Outpatient Prescriptions   Medication Sig Dispense Refill    aspirin 81 mg chewable tablet Take 1 Tab by mouth daily. 30 Tab 0    atorvastatin (LIPITOR) 10 mg tablet Take 1 Tab by mouth daily. 30 Tab 0    ergocalciferol (VITAMIN D2) 50,000 unit capsule Take 50,000 Units by mouth every Sunday.  albiglutide (TANZEUM) 50 mg/0.5 mL injector pen by SubCUTAneous route every Tuesday.  insulin regular human (AFREZZA) 4 unit CtDv Take 16 Units by inhalation three (3) times daily (with meals).  metFORMIN (GLUCOPHAGE) 500 mg tablet Take 500 mg by mouth nightly.  enalapril (VASOTEC) 5 mg tablet Take  by mouth daily.  insulin detemir (LEVEMIR) 100 unit/mL injection 30 Units by SubCUTAneous route nightly. Exam:  Visit Vitals    /88    Pulse 78    Resp 18    Wt 84.8 kg (187 lb)    BMI 31.12 kg/m2     NEUROLOGICAL EXAM:  General: Awake, alert, speech fluent  CN: PERRL, EOMI without nystagmus, VFF to confrontation, facial sensation and strength are normal and symmetric, hearing is intact to finger rub bilaterally, palate and tongue movements are intact and symmetric. Motor: Normal tone, bulk and strength bilaterally. Reflexes: 1/4 and symmetric, plantar stimulation is flexor.   Coordination: FNF, LUIS, HTS intact. Sensation: LT intact throughout. Gait: Normal-based and steady. LABS  Results for orders placed or performed during the hospital encounter of 05/14/18   CBC WITH AUTOMATED DIFF   Result Value Ref Range    WBC 8.7 3.6 - 11.0 K/uL    RBC 4.88 3.80 - 5.20 M/uL    HGB 13.7 11.5 - 16.0 g/dL    HCT 42.5 35.0 - 47.0 %    MCV 87.1 80.0 - 99.0 FL    MCH 28.1 26.0 - 34.0 PG    MCHC 32.2 30.0 - 36.5 g/dL    RDW 12.9 11.5 - 14.5 %    PLATELET 537 860 - 215 K/uL    MPV 9.1 8.9 - 12.9 FL    NRBC 0.0 0  WBC    ABSOLUTE NRBC 0.00 0.00 - 0.01 K/uL    NEUTROPHILS 62 32 - 75 %    LYMPHOCYTES 30 12 - 49 %    MONOCYTES 5 5 - 13 %    EOSINOPHILS 2 0 - 7 %    BASOPHILS 1 0 - 1 %    IMMATURE GRANULOCYTES 0 0.0 - 0.5 %    ABS. NEUTROPHILS 5.3 1.8 - 8.0 K/UL    ABS. LYMPHOCYTES 2.6 0.8 - 3.5 K/UL    ABS. MONOCYTES 0.5 0.0 - 1.0 K/UL    ABS. EOSINOPHILS 0.2 0.0 - 0.4 K/UL    ABS. BASOPHILS 0.0 0.0 - 0.1 K/UL    ABS. IMM. GRANS. 0.0 0.00 - 0.04 K/UL    DF AUTOMATED     METABOLIC PANEL, COMPREHENSIVE   Result Value Ref Range    Sodium 141 136 - 145 mmol/L    Potassium 3.5 3.5 - 5.1 mmol/L    Chloride 107 97 - 108 mmol/L    CO2 26 21 - 32 mmol/L    Anion gap 8 5 - 15 mmol/L    Glucose 104 (H) 65 - 100 mg/dL    BUN 11 6 - 20 MG/DL    Creatinine 0.90 0.55 - 1.02 MG/DL    BUN/Creatinine ratio 12 12 - 20      GFR est AA >60 >60 ml/min/1.73m2    GFR est non-AA >60 >60 ml/min/1.73m2    Calcium 8.9 8.5 - 10.1 MG/DL    Bilirubin, total 0.4 0.2 - 1.0 MG/DL    ALT (SGPT) 25 12 - 78 U/L    AST (SGOT) 17 15 - 37 U/L    Alk.  phosphatase 69 45 - 117 U/L    Protein, total 7.7 6.4 - 8.2 g/dL    Albumin 3.6 3.5 - 5.0 g/dL    Globulin 4.1 (H) 2.0 - 4.0 g/dL    A-G Ratio 0.9 (L) 1.1 - 2.2     PROTHROMBIN TIME + INR   Result Value Ref Range    INR 1.0 0.9 - 1.1      Prothrombin time 10.5 9.0 - 11.1 sec   PTT   Result Value Ref Range    aPTT 30.0 22.1 - 32.0 sec    aPTT, therapeutic range     58.0 - 77.0 SECS   LIPID PANEL   Result Value Ref Range    LIPID PROFILE          Cholesterol, total 140 <200 MG/DL    Triglyceride 78 <150 MG/DL    HDL Cholesterol 52 MG/DL    LDL, calculated 72.4 0 - 100 MG/DL    VLDL, calculated 15.6 MG/DL    CHOL/HDL Ratio 2.7 0 - 5.0     HEMOGLOBIN A1C WITH EAG   Result Value Ref Range    Hemoglobin A1c 9.0 (H) 4.2 - 6.3 %    Est. average glucose 212 mg/dL   CBC W/O DIFF   Result Value Ref Range    WBC 8.0 3.6 - 11.0 K/uL    RBC 4.75 3.80 - 5.20 M/uL    HGB 13.3 11.5 - 16.0 g/dL    HCT 40.2 35.0 - 47.0 %    MCV 84.6 80.0 - 99.0 FL    MCH 28.0 26.0 - 34.0 PG    MCHC 33.1 30.0 - 36.5 g/dL    RDW 12.7 11.5 - 14.5 %    PLATELET 116 032 - 983 K/uL    MPV 9.4 8.9 - 12.9 FL    NRBC 0.0 0  WBC    ABSOLUTE NRBC 0.00 0.00 - 2.14 K/uL   METABOLIC PANEL, COMPREHENSIVE   Result Value Ref Range    Sodium 141 136 - 145 mmol/L    Potassium 3.8 3.5 - 5.1 mmol/L    Chloride 108 97 - 108 mmol/L    CO2 26 21 - 32 mmol/L    Anion gap 7 5 - 15 mmol/L    Glucose 139 (H) 65 - 100 mg/dL    BUN 12 6 - 20 MG/DL    Creatinine 0.79 0.55 - 1.02 MG/DL    BUN/Creatinine ratio 15 12 - 20      GFR est AA >60 >60 ml/min/1.73m2    GFR est non-AA >60 >60 ml/min/1.73m2    Calcium 8.7 8.5 - 10.1 MG/DL    Bilirubin, total 0.3 0.2 - 1.0 MG/DL    ALT (SGPT) 23 12 - 78 U/L    AST (SGOT) 20 15 - 37 U/L    Alk.  phosphatase 64 45 - 117 U/L    Protein, total 6.8 6.4 - 8.2 g/dL    Albumin 3.1 (L) 3.5 - 5.0 g/dL    Globulin 3.7 2.0 - 4.0 g/dL    A-G Ratio 0.8 (L) 1.1 - 2.2     POC INR   Result Value Ref Range    INR (POC) 1.1 <1.2     GLUCOSE, POC   Result Value Ref Range    Glucose (POC) 202 (H) 65 - 100 mg/dL    Performed by [address] [place] [address], POC   Result Value Ref Range    Glucose (POC) 123 (H) 65 - 100 mg/dL    Performed by [address] [place] [address], POC   Result Value Ref Range    Glucose (POC) 162 (H) 65 - 100 mg/dL    Performed by Amita Brink    EKG, 12 LEAD, INITIAL   Result Value Ref Range    Ventricular Rate 85 BPM    Atrial Rate 85 BPM    P-R Interval 170 ms    QRS Duration 98 ms    Q-T Interval 432 ms    QTC Calculation (Bezet) 514 ms    Calculated P Axis 27 degrees    Calculated R Axis -23 degrees    Calculated T Axis 20 degrees    Diagnosis       Normal sinus rhythm  No previous ECGs available  Confirmed by Cherylene Figures, M.D., Brittany Tian ([id number]) on 5/14/2018 4:00:09 PM         IMAGING:  MRI Results (most recent):    Results from [place] encounter on 05/14/18   MRI BRAIN W WO CONT   Narrative CLINICAL HISTORY: TIA    INDICATION: TIA. COMPARISON: CT head 5/14/2013    TECHNIQUE: MR examination of the brain includes axial and sagittal T1  pre-and-post contrast, axial T2, axial FLAIR, axial gradient echo, axial DWI,  coronal T1 postcontrast.  CONTRAST: The patient was then administered gadolinium-based contrast material  axial and coronal T1-weighted postcontrast enhanced imaging was obtained. FINDINGS:   There is no intracranial mass, hemorrhage or evidence of acute infarction. Minimal foci of increased T2 signal intensity in the corona radiata. Likely of  no clinical significance. Tiny posterior communicator artery on the right. Vertebrobasilar system is diminutive in size. .  There is no Chiari or syrinx. The pituitary and infundibulum are grossly  unremarkable. There is no skull base mass. Cerebellopontine angles are grossly  unremarkable. The major intracranial vascular flow-voids are unremarkable. There is no abnormal parenchymal enhancement. There is no abnormal meningeal  enhancement. The cavernous sinuses are symmetric. Optic chiasm and infundibulum  grossly unremarkable. Orbits are grossly symmetric. Dural venous sinuses are  grossly patent. The brain architecture is normal. There is no evidence of  midline shift or mass-effect. There are no extra-axial fluid collections. The mastoid air cells are well pneumatized and clear. Impression IMPRESSION:  Normal MRI the brain for patient age.     There is no intracranial mass, hemorrhage or evidence of acute infarction              Assessment:     Encounter Diagnoses     ICD-10-CM ICD-9-CM   1. Transient cerebral ischemia, unspecified type G45.9 435.9   2. Numbness and tingling of left side of face R20.0 782.0    R20.2    3. Type 2 diabetes mellitus with other neurologic complication, unspecified whether long term insulin use (HCC) E11.49 22.60   60year old Leblanc Nai h/o DM here for hospital f/u regarding acute L sided paresthesias as well as L posterior knee pain. CTH/CTA completed on admission did not reveal any acute ischemia or significant vessel stenosis. LE U/S negative for DVT. MRI Brain WWO also performed and normal.  DDx: TIA  No recurrence of paresthesias since discharge. She is doing well on antiplatelet therapy. Discussed stroke guidelines and need for better blood pressure and DM control. Plan:   Cont. ASA 81mg for stroke prevention  Goal SBP<140, LDL<70, HbA1C<7.0    Follow-up Disposition:  Return if symptoms worsen or fail to improve.       Signed:  Beulah Li DO  8/6/2018  3:28 PM

## 2020-03-06 ENCOUNTER — HOSPITAL ENCOUNTER (EMERGENCY)
Age: 60
Discharge: HOME | End: 2020-03-06
Attending: EMERGENCY MEDICINE | Admitting: EMERGENCY MEDICINE
Payer: COMMERCIAL

## 2020-03-06 ENCOUNTER — APPOINTMENT (OUTPATIENT)
Dept: MRI IMAGING | Age: 60
End: 2020-03-06
Attending: EMERGENCY MEDICINE
Payer: COMMERCIAL

## 2020-03-06 ENCOUNTER — APPOINTMENT (OUTPATIENT)
Dept: CT IMAGING | Age: 60
End: 2020-03-06
Attending: STUDENT IN AN ORGANIZED HEALTH CARE EDUCATION/TRAINING PROGRAM
Payer: COMMERCIAL

## 2020-03-06 VITALS
HEIGHT: 65 IN | SYSTOLIC BLOOD PRESSURE: 145 MMHG | HEART RATE: 79 BPM | OXYGEN SATURATION: 99 % | DIASTOLIC BLOOD PRESSURE: 71 MMHG | BODY MASS INDEX: 34.16 KG/M2 | WEIGHT: 205 LBS | TEMPERATURE: 98.8 F | RESPIRATION RATE: 17 BRPM

## 2020-03-06 DIAGNOSIS — R53.1 WEAKNESS: Primary | ICD-10-CM

## 2020-03-06 LAB
ALBUMIN SERPL-MCNC: 3.7 G/DL (ref 3.5–5)
ALBUMIN/GLOB SERPL: 0.9 {RATIO} (ref 1.1–2.2)
ALP SERPL-CCNC: 68 U/L (ref 45–117)
ALT SERPL-CCNC: 26 U/L (ref 12–78)
ANION GAP SERPL CALC-SCNC: 5 MMOL/L (ref 5–15)
APPEARANCE UR: CLEAR
AST SERPL-CCNC: 22 U/L (ref 15–37)
ATRIAL RATE: 79 BPM
BASOPHILS # BLD: 0 K/UL (ref 0–0.1)
BASOPHILS NFR BLD: 0 % (ref 0–1)
BILIRUB SERPL-MCNC: 0.4 MG/DL (ref 0.2–1)
BILIRUB UR QL: NEGATIVE
BUN SERPL-MCNC: 12 MG/DL (ref 6–20)
BUN/CREAT SERPL: 13 (ref 12–20)
CALCIUM SERPL-MCNC: 9.2 MG/DL (ref 8.5–10.1)
CALCULATED P AXIS, ECG09: 26 DEGREES
CALCULATED R AXIS, ECG10: -25 DEGREES
CALCULATED T AXIS, ECG11: 5 DEGREES
CHLORIDE SERPL-SCNC: 108 MMOL/L (ref 97–108)
CO2 SERPL-SCNC: 26 MMOL/L (ref 21–32)
COLOR UR: ABNORMAL
COMMENT, HOLDF: NORMAL
CREAT SERPL-MCNC: 0.89 MG/DL (ref 0.55–1.02)
DIAGNOSIS, 93000: NORMAL
DIFFERENTIAL METHOD BLD: ABNORMAL
EOSINOPHIL # BLD: 0.2 K/UL (ref 0–0.4)
EOSINOPHIL NFR BLD: 2 % (ref 0–7)
ERYTHROCYTE [DISTWIDTH] IN BLOOD BY AUTOMATED COUNT: 13.3 % (ref 11.5–14.5)
GLOBULIN SER CALC-MCNC: 4.3 G/DL (ref 2–4)
GLUCOSE SERPL-MCNC: 160 MG/DL (ref 65–100)
GLUCOSE UR STRIP.AUTO-MCNC: >1000 MG/DL
HCT VFR BLD AUTO: 46.6 % (ref 35–47)
HGB BLD-MCNC: 14.8 G/DL (ref 11.5–16)
HGB UR QL STRIP: NEGATIVE
IMM GRANULOCYTES # BLD AUTO: 0 K/UL (ref 0–0.04)
IMM GRANULOCYTES NFR BLD AUTO: 0 % (ref 0–0.5)
KETONES UR QL STRIP.AUTO: NEGATIVE MG/DL
LEUKOCYTE ESTERASE UR QL STRIP.AUTO: NEGATIVE
LYMPHOCYTES # BLD: 1.8 K/UL (ref 0.8–3.5)
LYMPHOCYTES NFR BLD: 19 % (ref 12–49)
MCH RBC QN AUTO: 27.7 PG (ref 26–34)
MCHC RBC AUTO-ENTMCNC: 31.8 G/DL (ref 30–36.5)
MCV RBC AUTO: 87.1 FL (ref 80–99)
MONOCYTES # BLD: 0.4 K/UL (ref 0–1)
MONOCYTES NFR BLD: 5 % (ref 5–13)
NEUTS SEG # BLD: 6.9 K/UL (ref 1.8–8)
NEUTS SEG NFR BLD: 74 % (ref 32–75)
NITRITE UR QL STRIP.AUTO: NEGATIVE
NRBC # BLD: 0 K/UL (ref 0–0.01)
NRBC BLD-RTO: 0 PER 100 WBC
P-R INTERVAL, ECG05: 172 MS
PH UR STRIP: 5 [PH] (ref 5–8)
PLATELET # BLD AUTO: 267 K/UL (ref 150–400)
PMV BLD AUTO: 10.2 FL (ref 8.9–12.9)
POTASSIUM SERPL-SCNC: 4 MMOL/L (ref 3.5–5.1)
PROT SERPL-MCNC: 8 G/DL (ref 6.4–8.2)
PROT UR STRIP-MCNC: NEGATIVE MG/DL
Q-T INTERVAL, ECG07: 420 MS
QRS DURATION, ECG06: 90 MS
QTC CALCULATION (BEZET), ECG08: 481 MS
RBC # BLD AUTO: 5.35 M/UL (ref 3.8–5.2)
SAMPLES BEING HELD,HOLD: NORMAL
SODIUM SERPL-SCNC: 139 MMOL/L (ref 136–145)
SP GR UR REFRACTOMETRY: 1.01 (ref 1–1.03)
UR CULT HOLD, URHOLD: NORMAL
UROBILINOGEN UR QL STRIP.AUTO: 0.2 EU/DL (ref 0.2–1)
VENTRICULAR RATE, ECG03: 79 BPM
WBC # BLD AUTO: 9.4 K/UL (ref 3.6–11)

## 2020-03-06 PROCEDURE — 70450 CT HEAD/BRAIN W/O DYE: CPT

## 2020-03-06 PROCEDURE — 81003 URINALYSIS AUTO W/O SCOPE: CPT

## 2020-03-06 PROCEDURE — 93005 ELECTROCARDIOGRAM TRACING: CPT

## 2020-03-06 PROCEDURE — 36415 COLL VENOUS BLD VENIPUNCTURE: CPT

## 2020-03-06 PROCEDURE — 85025 COMPLETE CBC W/AUTO DIFF WBC: CPT

## 2020-03-06 PROCEDURE — 80053 COMPREHEN METABOLIC PANEL: CPT

## 2020-03-06 PROCEDURE — 70551 MRI BRAIN STEM W/O DYE: CPT

## 2020-03-06 PROCEDURE — 74011250636 HC RX REV CODE- 250/636: Performed by: EMERGENCY MEDICINE

## 2020-03-06 PROCEDURE — 99284 EMERGENCY DEPT VISIT MOD MDM: CPT

## 2020-03-06 RX ADMIN — SODIUM CHLORIDE 1000 ML: 900 INJECTION, SOLUTION INTRAVENOUS at 19:14

## 2020-03-06 NOTE — ED TRIAGE NOTES
Triage:  Pt to ED due to concerns over increased generalized weakness and dizziness since noon today. Pt states woke up this AM well, and around 1 began to develop the aforementioned symptoms. Pt states currently requires assistance with standing and ADL's.

## 2020-03-07 NOTE — ED PROVIDER NOTES
63 y.o. female with past medical history significant for DM, and TIA who presents from PCP via  with chief complaint of weakness. Patient states she began feeling weakness in her legs while standing this morning upon waking up. Patient states at 1:30pm, he felt a tingling in her face, and became nauseated and diaphoretic. Patient states following these episodes, she went to see her PCP however they were unable to detect any abnormal findings. Patient states she feels unsteady on her feet. Patient denies any cough, recent travel, increased stress, leg pain, weakness throughout the entire body, back pain, or dizziness. Patient denies feeling anything like this in the past. There are no other acute medical concerns at this time. Social hx: Never Smoker, Rare EtOH use  PCP: Veronica Dill MD      Note written by Elías Bahena, as dictated by Zeinab Garcia MD 7:24 PM      The history is provided by the patient. No  was used. Past Medical History:   Diagnosis Date    Diabetes ([facility] [address]     TIA (transient ischemic attack)        Past Surgical History:   Procedure Laterality Date    HX GYN  2007    hysterectomy    HX ORTHOPAEDIC  2011    double wrist    Pilar Tovar    removal of left glands armpit          No family history on file.     Social History     Socioeconomic History    Marital status:      Spouse name: Not on file    Number of children: Not on file    Years of education: Not on file    Highest education level: Not on file   Occupational History    Not on file   Social Needs    Financial resource strain: Not on file    Food insecurity:     Worry: Not on file     Inability: Not on file    Transportation needs:     Medical: Not on file     Non-medical: Not on file   Tobacco Use    Smoking status: Never Smoker    Smokeless tobacco: Never Used   Substance and Sexual Activity    Alcohol use: Not Currently     Comment: rarely    Drug use: Not on file    Sexual activity: Not on file   Lifestyle    Physical activity:     Days per week: Not on file     Minutes per session: Not on file    Stress: Not on file   Relationships    Social connections:     Talks on phone: Not on file     Gets together: Not on file     Attends Oriental orthodox service: Not on file     Active member of club or organization: Not on file     Attends meetings of clubs or organizations: Not on file     Relationship status: Not on file    Intimate partner violence:     Fear of current or ex partner: Not on file     Emotionally abused: Not on file     Physically abused: Not on file     Forced sexual activity: Not on file   Other Topics Concern    Not on file   Social History Narrative    Not on file         ALLERGIES: Latex; Ciprofloxacin; Codeine; Keflex [cephalexin]; and Pcn [penicillins]    Review of Systems   Constitutional: Positive for diaphoresis. Negative for chills and fever. HENT: Negative for drooling and nosebleeds. Eyes: Negative for pain and itching. Respiratory: Negative for choking and stridor. Cardiovascular: Negative for leg swelling. Gastrointestinal: Positive for nausea. Negative for abdominal distention and rectal pain. Endocrine: Negative for heat intolerance and polyphagia. Genitourinary: Negative for enuresis and genital sores. Musculoskeletal: Negative for arthralgias and joint swelling. Skin: Negative for color change. Allergic/Immunologic: Negative for immunocompromised state. Neurological: Positive for weakness. Negative for dizziness, tremors, syncope, speech difficulty, light-headedness and numbness. Hematological: Negative for adenopathy. Psychiatric/Behavioral: Negative for dysphoric mood and sleep disturbance.        Vitals:    03/06/20 1639   BP: 155/78   Pulse: 84   Resp: 18   Temp: 98.7 °F (37.1 °C)   SpO2: 98%   Weight: 93 kg (205 lb)   Height: 5' 5\" (1.651 m)            Physical Exam  Vitals signs and nursing note reviewed. Constitutional:       General: She is not in acute distress. Appearance: She is well-developed. She is not ill-appearing, toxic-appearing or diaphoretic. HENT:      Head: Normocephalic and atraumatic. Nose: Nose normal.      Mouth/Throat:      Mouth: Mucous membranes are moist.   Eyes:      Conjunctiva/sclera: Conjunctivae normal.   Neck:      Musculoskeletal: Normal range of motion and neck supple. Cardiovascular:      Rate and Rhythm: Regular rhythm. Heart sounds: Normal heart sounds. Pulmonary:      Effort: Pulmonary effort is normal. No respiratory distress. Abdominal:      General: There is no distension. Palpations: Abdomen is soft. Musculoskeletal: Normal range of motion. General: No deformity. Skin:     General: Skin is warm and dry. Neurological:      Mental Status: She is alert and oriented to person, place, and time. Cranial Nerves: Cranial nerves are intact. No cranial nerve deficit, dysarthria or facial asymmetry. Sensory: No sensory deficit. Motor: No weakness or abnormal muscle tone. Coordination: Coordination normal.      Gait: Gait normal.   Psychiatric:         Behavior: Behavior normal.      Comments: Odd affect. Crying during interview. Note written by Elías Clifford, as dictated by Efra Cline MD 7:24 PM      MDM  Number of Diagnoses or Management Options  Weakness:   Diagnosis management comments: She reports feeling heaviness coming over her while at rest.  Then reports her legs feel wobbly. History of TIA, reports she is very scared that this could be a stroke. Notes her symptoms have improved since onset. Able to ambulate and bear weight but knees keep buckling during my examination which pt reports 'legs feel wobbly'. 5/5 strength in BL LE. Very low suspicion for TIA/CVA at this time. MRI negative for acute pathology. I discussed results with her in detail.  On reeval, pt able to ambulate with steady gait. Notes no weakness, numbness any longer. Will FU w/her neurologist in clinic next week. Stable for dc. ED Course as of Mar 07 1252   Fri Mar 06, 2020   2695 On reevaluation, pt notes her symptoms have improved. Her head 'feels heavy'. Would like something to eat. Awaiting MRI to rule out CVA. [AL]      ED Course User Index  [AL] Brielle Elizabeth MD       Procedures    Patient's results have been reviewed with them. Patient and/or family have verbally conveyed their understanding and agreement of the patient's signs, symptoms, diagnosis, treatment and prognosis and additionally agree to follow up as recommended or return to the Emergency Room should their condition change prior to follow-up. Discharge instructions have also been provided to the patient with some educational information regarding their diagnosis as well a list of reasons why they would want to return to the ER prior to their follow-up appointment should their condition change.

## 2022-03-19 PROBLEM — R20.0 NUMBNESS AND TINGLING OF LEFT SIDE OF FACE: Status: ACTIVE | Noted: 2018-05-14

## 2022-03-19 PROBLEM — R20.2 NUMBNESS AND TINGLING OF LEFT SIDE OF FACE: Status: ACTIVE | Noted: 2018-05-14

## 2022-03-20 PROBLEM — G45.9 TIA (TRANSIENT ISCHEMIC ATTACK): Status: ACTIVE | Noted: 2018-05-15

## 2025-05-27 ENCOUNTER — TRANSCRIBE ORDERS (OUTPATIENT)
Facility: HOSPITAL | Age: 65
End: 2025-05-27

## 2025-05-27 DIAGNOSIS — M81.0 SENILE OSTEOPOROSIS: Primary | ICD-10-CM

## 2025-07-07 ENCOUNTER — HOSPITAL ENCOUNTER (OUTPATIENT)
Facility: HOSPITAL | Age: 65
Discharge: HOME | End: 2025-07-10
Payer: COMMERCIAL

## 2025-07-07 VITALS — HEIGHT: 65 IN | BODY MASS INDEX: 26.66 KG/M2 | WEIGHT: 160 LBS

## 2025-07-07 DIAGNOSIS — M81.0 SENILE OSTEOPOROSIS: ICD-10-CM

## 2025-07-07 PROCEDURE — 77080 DXA BONE DENSITY AXIAL: CPT
